# Patient Record
Sex: FEMALE | Race: BLACK OR AFRICAN AMERICAN | NOT HISPANIC OR LATINO | Employment: FULL TIME | URBAN - METROPOLITAN AREA
[De-identification: names, ages, dates, MRNs, and addresses within clinical notes are randomized per-mention and may not be internally consistent; named-entity substitution may affect disease eponyms.]

---
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
## 2018-02-12 ENCOUNTER — HOSPITAL ENCOUNTER (EMERGENCY)
Facility: HOSPITAL | Age: 18
Discharge: HOME | End: 2018-02-12
Attending: EMERGENCY MEDICINE | Admitting: EMERGENCY MEDICINE

## 2018-02-12 VITALS
TEMPERATURE: 98.7 F | HEIGHT: 68 IN | OXYGEN SATURATION: 97 % | HEART RATE: 90 BPM | BODY MASS INDEX: 44.41 KG/M2 | RESPIRATION RATE: 20 BRPM | SYSTOLIC BLOOD PRESSURE: 126 MMHG | DIASTOLIC BLOOD PRESSURE: 84 MMHG | WEIGHT: 293 LBS

## 2018-02-12 DIAGNOSIS — M54.2 NECK DISCOMFORT: Primary | ICD-10-CM

## 2018-02-12 LAB
ALBUMIN SERPL-MCNC: 4.9 G/DL (ref 3.2–4.8)
ALBUMIN/GLOB SERPL: 1.6 G/DL (ref 1.5–2.5)
ALP SERPL-CCNC: 127 U/L (ref 25–100)
ALT SERPL W P-5'-P-CCNC: 21 U/L (ref 7–40)
ANION GAP SERPL CALCULATED.3IONS-SCNC: 4 MMOL/L (ref 3–11)
AST SERPL-CCNC: 16 U/L (ref 0–33)
BASOPHILS # BLD AUTO: 0.04 10*3/MM3 (ref 0–0.2)
BASOPHILS NFR BLD AUTO: 0.4 % (ref 0–1)
BILIRUB SERPL-MCNC: 0.3 MG/DL (ref 0.3–1.2)
BUN BLD-MCNC: 11 MG/DL (ref 9–23)
BUN/CREAT SERPL: 11 (ref 7–25)
CALCIUM SPEC-SCNC: 9.4 MG/DL (ref 8.7–10.4)
CHLORIDE SERPL-SCNC: 104 MMOL/L (ref 99–109)
CO2 SERPL-SCNC: 26 MMOL/L (ref 20–31)
CREAT BLD-MCNC: 1 MG/DL (ref 0.6–1.3)
DEPRECATED RDW RBC AUTO: 45.3 FL (ref 37–54)
EOSINOPHIL # BLD AUTO: 0.09 10*3/MM3 (ref 0–0.3)
EOSINOPHIL NFR BLD AUTO: 0.9 % (ref 0–3)
ERYTHROCYTE [DISTWIDTH] IN BLOOD BY AUTOMATED COUNT: 15.5 % (ref 11.3–14.5)
GFR SERPL CREATININE-BSD FRML MDRD: 87 ML/MIN/1.73
GLOBULIN UR ELPH-MCNC: 3.1 GM/DL
GLUCOSE BLD-MCNC: 89 MG/DL (ref 70–100)
HCT VFR BLD AUTO: 37.3 % (ref 34.5–44)
HETEROPH AB SER QL LA: NEGATIVE
HGB BLD-MCNC: 11.7 G/DL (ref 11.5–15.5)
IMM GRANULOCYTES # BLD: 0.02 10*3/MM3 (ref 0–0.03)
IMM GRANULOCYTES NFR BLD: 0.2 % (ref 0–0.6)
LYMPHOCYTES # BLD AUTO: 3.03 10*3/MM3 (ref 0.6–4.8)
LYMPHOCYTES NFR BLD AUTO: 31.8 % (ref 24–44)
MCH RBC QN AUTO: 24.9 PG (ref 27–31)
MCHC RBC AUTO-ENTMCNC: 31.4 G/DL (ref 32–36)
MCV RBC AUTO: 79.5 FL (ref 80–99)
MONOCYTES # BLD AUTO: 0.48 10*3/MM3 (ref 0–1)
MONOCYTES NFR BLD AUTO: 5 % (ref 0–12)
NEUTROPHILS # BLD AUTO: 5.87 10*3/MM3 (ref 1.5–8.3)
NEUTROPHILS NFR BLD AUTO: 61.7 % (ref 41–71)
PLATELET # BLD AUTO: 289 10*3/MM3 (ref 150–450)
PMV BLD AUTO: 10.1 FL (ref 6–12)
POTASSIUM BLD-SCNC: 3.6 MMOL/L (ref 3.5–5.5)
PROT SERPL-MCNC: 8 G/DL (ref 5.7–8.2)
RBC # BLD AUTO: 4.69 10*6/MM3 (ref 3.89–5.14)
SODIUM BLD-SCNC: 134 MMOL/L (ref 132–146)
T4 FREE SERPL-MCNC: 0.93 NG/DL (ref 0.89–1.76)
TROPONIN I SERPL-MCNC: 0 NG/ML (ref 0–0.07)
TSH SERPL DL<=0.05 MIU/L-ACNC: 1.77 MIU/ML (ref 0.35–5.35)
WBC NRBC COR # BLD: 9.53 10*3/MM3 (ref 4.5–13.5)

## 2018-02-12 PROCEDURE — 85025 COMPLETE CBC W/AUTO DIFF WBC: CPT | Performed by: EMERGENCY MEDICINE

## 2018-02-12 PROCEDURE — 99283 EMERGENCY DEPT VISIT LOW MDM: CPT

## 2018-02-12 PROCEDURE — 84443 ASSAY THYROID STIM HORMONE: CPT | Performed by: EMERGENCY MEDICINE

## 2018-02-12 PROCEDURE — 86308 HETEROPHILE ANTIBODY SCREEN: CPT | Performed by: EMERGENCY MEDICINE

## 2018-02-12 PROCEDURE — 80053 COMPREHEN METABOLIC PANEL: CPT | Performed by: EMERGENCY MEDICINE

## 2018-02-12 PROCEDURE — 84439 ASSAY OF FREE THYROXINE: CPT | Performed by: EMERGENCY MEDICINE

## 2018-02-12 PROCEDURE — 84484 ASSAY OF TROPONIN QUANT: CPT

## 2018-02-12 PROCEDURE — 93005 ELECTROCARDIOGRAM TRACING: CPT

## 2018-02-13 NOTE — DISCHARGE INSTRUCTIONS
If you develop acute or urgent symptoms return to the emergency room for evaluation.    Follow up with one of the [facility] physician groups below to setup primary care. If you have trouble following up, please call Jessie Kendrick, our transitional care nurse, at [phone].    (Dr. Sharpe, Dr. Mathur, Dr. Hanna, and Dr. Armstrong.)  [facility], Primary Care, [phone], [address], [place] [address]    [facility], Primary Care, [phone], [address], [address] [place], [address] [facility], Primary Care, [phone], [address], [address] [place], [address] [facility], Primary Care, [phone], [address], [address] [place], [address]     [place] [address] [facility], Primary Care, [phone], [address], [address] [place], [address]    [place] [address] [facility], Primary Care, [phone], [address]    [place], [address] [facility], Primary Care, [phone], [address] [place], [address] [facility], Primary Care, [phone], [address], [address] [place], [address] [facility]) [facility], Primary Care, [phone], [address], [address] [place], [address] [facility], Primary Care, [phone], [address], [address] [place], [address] [facility], Primary Care, [phone], [address], [address], [place], [address] [place] [address], Primary Care, [phone], [address] [facility], [address], [facility]    Follow up with one of the physician centers below to setup  primary care.    [facility], [phone], [address], [address], [place], [facility]    [facility], [phone], [address] [facility], [address] [place] [address], [phone], [address] [facility] [address] [place] [address];     [facility], [phone], [address] [place] [address]

## 2018-02-13 NOTE — ED PROVIDER NOTES
"Subjective   HPI Comments: Hien Jean is a 17 y.o.female who presents to the ED with c/o throat discomfort. For the past 2 months the patient has had an intermittent feeling of squeezing in her left throat. It is not painful and she is not short of breath. Her symptoms are not noticeably exacerbated or relieved by anything. After continued symptoms today she presents to the ED for evaluation. At the ED her mother reports that she has had a decreased appetite and appears fatigued. The patient denies any other acute symptoms at this time.      Patient is a 17 y.o. female presenting with general illness.   History provided by:  Patient  Illness   Location:  Left throat  Quality:  \"squeezing\"  Onset quality:  Gradual  Duration:  2 months  Timing:  Intermittent  Progression:  Unchanged  Chronicity:  New  Relieved by:  None tried  Worsened by:  Nothing  Ineffective treatments:  None tried  Associated symptoms: no chest pain, no congestion, no cough, no diarrhea, no fever, no nausea, no shortness of breath, no sore throat and no vomiting        Review of Systems   Constitutional: Negative for chills, diaphoresis and fever.   HENT: Negative for congestion and sore throat.         Feeling of squeezing in throat   Respiratory: Negative for cough and shortness of breath.    Cardiovascular: Negative for chest pain.   Gastrointestinal: Negative for diarrhea, nausea and vomiting.   All other systems reviewed and are negative.      Past Medical History:   Diagnosis Date   • History of blood transfusion    • Migraines        No Known Allergies    Past Surgical History:   Procedure Laterality Date   • ADENOIDECTOMY     • DENTAL PROCEDURE     • TONSILLECTOMY         History reviewed. No pertinent family history.    Social History     Social History   • Marital status: Single     Spouse name: N/A   • Number of children: N/A   • Years of education: N/A     Social History Main Topics   • Smoking status: Never Smoker   • Smokeless " tobacco: None   • Alcohol use No   • Drug use: No   • Sexual activity: No     Other Topics Concern   • None     Social History Narrative         Objective   Physical Exam   Constitutional: She is oriented to person, place, and time. She appears well-developed and well-nourished. No distress.   HENT:   Head: Normocephalic and atraumatic.   Right Ear: External ear normal.   Left Ear: External ear normal.   Mouth/Throat: Oropharynx is clear and moist. No oropharyngeal exudate.   Uvula is midline. No asymmetry in the posterior oropharynx.   Eyes: Conjunctivae are normal. No scleral icterus.   Neck: Normal range of motion. Neck supple. No JVD present. No thyromegaly present.   Cardiovascular: Normal rate, regular rhythm and normal heart sounds.  Exam reveals no gallop and no friction rub.    No murmur heard.  Pulmonary/Chest: Effort normal and breath sounds normal. No respiratory distress. She has no wheezes. She has no rales.   Abdominal: Soft. Bowel sounds are normal. She exhibits no distension. There is no tenderness. There is no rebound and no guarding.   Musculoskeletal: Normal range of motion. She exhibits no edema.   Lymphadenopathy:     She has no cervical adenopathy.   Neurological: She is alert and oriented to person, place, and time.   Skin: Skin is warm and dry. She is not diaphoretic.   Psychiatric: She has a normal mood and affect. Her behavior is normal.   Nursing note and vitals reviewed.      Procedures         ED Course  ED Course     No results found for this or any previous visit (from the past 24 hour(s)).  Note: In addition to lab results from this visit, the labs listed above may include labs taken at another facility or during a different encounter within the last 24 hours. Please correlate lab times with ED admission and discharge times for further clarification of the services performed during this visit.    No orders to display     Vitals:    02/12/18 1857 02/12/18 1909 02/12/18 2100   BP:  "143/74  126/84   Pulse: 99  90   Resp: 20     Temp: 98.7 °F (37.1 °C)     TempSrc: Oral     SpO2: 96%  97%   Weight:  134 kg (295 lb)    Height: 172.7 cm (68\")       Medications - No data to display  ECG/EMG Results (last 24 hours)     Procedure Component Value Units Date/Time    ECG 12 Lead [[id number]] Collected:  02/12/18 1909     Updated:  02/12/18 1910                    MDM    Final diagnoses:   Neck discomfort       Documentation assistance provided by diego Bailon.  Information recorded by the scrsusan was done at my direction and has been verified and validated by me.     Jose Bailon  02/12/18 1927       Horacio Banda MD  02/19/18 2205    "

## 2019-05-06 ENCOUNTER — OFFICE VISIT (OUTPATIENT)
Dept: INTERNAL MEDICINE | Facility: CLINIC | Age: 19
End: 2019-05-06

## 2019-05-06 VITALS
DIASTOLIC BLOOD PRESSURE: 86 MMHG | RESPIRATION RATE: 16 BRPM | BODY MASS INDEX: 39.89 KG/M2 | WEIGHT: 263.2 LBS | SYSTOLIC BLOOD PRESSURE: 128 MMHG | OXYGEN SATURATION: 99 % | TEMPERATURE: 98.1 F | HEART RATE: 78 BPM | HEIGHT: 68 IN

## 2019-05-06 DIAGNOSIS — Z91.018 MULTIPLE FOOD ALLERGIES: ICD-10-CM

## 2019-05-06 DIAGNOSIS — E66.01 MORBIDLY OBESE (HCC): ICD-10-CM

## 2019-05-06 DIAGNOSIS — G43.009 MIGRAINE WITHOUT AURA AND WITHOUT STATUS MIGRAINOSUS, NOT INTRACTABLE: Primary | ICD-10-CM

## 2019-05-06 PROCEDURE — 99204 OFFICE O/P NEW MOD 45 MIN: CPT | Performed by: NURSE PRACTITIONER

## 2019-05-06 RX ORDER — SUMATRIPTAN 25 MG/1
TABLET, FILM COATED ORAL
Qty: 9 TABLET | Refills: 0 | Status: SHIPPED | OUTPATIENT
Start: 2019-05-06 | End: 2021-08-17

## 2019-05-06 NOTE — PATIENT INSTRUCTIONS
Migraine Headache  A migraine headache is a very strong throbbing pain on one side or both sides of your head. Migraines can also cause other symptoms. Talk with your doctor about what things may bring on (trigger) your migraine headaches.  Follow these instructions at home:  Medicines  · Take over-the-counter and prescription medicines only as told by your doctor.  · Do not drive or use heavy machinery while taking prescription pain medicine.  · To prevent or treat constipation while you are taking prescription pain medicine, your doctor may recommend that you:  ? Drink enough fluid to keep your pee (urine) clear or pale yellow.  ? Take over-the-counter or prescription medicines.  ? Eat foods that are high in fiber. These include fresh fruits and vegetables, whole grains, and beans.  ? Limit foods that are high in fat and processed sugars. These include fried and sweet foods.  Lifestyle  · Avoid alcohol.  · Do not use any products that contain nicotine or tobacco, such as cigarettes and e-cigarettes. If you need help quitting, ask your doctor.  · Get at least 8 hours of sleep every night.  · Limit your stress.  General instructions    · Keep a journal to find out what may bring on your migraines. For example, write down:  ? What you eat and drink.  ? How much sleep you get.  ? Any change in what you eat or drink.  ? Any change in your medicines.  · If you have a migraine:  ? Avoid things that make your symptoms worse, such as bright lights.  ? It may help to lie down in a dark, quiet room.  ? Do not drive or use heavy machinery.  ? Ask your doctor what activities are safe for you.  · Keep all follow-up visits as told by your doctor. This is important.  Contact a doctor if:  · You get a migraine that is different or worse than your usual migraines.  Get help right away if:  · Your migraine gets very bad.  · You have a fever.  · You have a stiff neck.  · You have trouble seeing.  · Your muscles feel weak or like you  cannot control them.  · You start to lose your balance a lot.  · You start to have trouble walking.  · You pass out (faint).  This information is not intended to replace advice given to you by your health care provider. Make sure you discuss any questions you have with your health care provider.  Document Released: 09/26/2009 Document Revised: 07/07/2017 Document Reviewed: 06/05/2017  [employer] Interactive Patient Education © 2019 [employer]    Exercising to Lose Weight  Exercising can help you to lose weight. In order to lose weight through exercise, you need to do vigorous-intensity exercise. You can tell that you are exercising with vigorous intensity if you are breathing very hard and fast and cannot hold a conversation while exercising.  Moderate-intensity exercise helps to maintain your current weight. You can tell that you are exercising at a moderate level if you have a higher heart rate and faster breathing, but you are still able to hold a conversation.  How often should I exercise?  Choose an activity that you enjoy and set realistic goals. Your health care provider can help you to make an activity plan that works for you. Exercise regularly as directed by your health care provider. This may include:  · Doing resistance training twice each week, such as:  ? Push-ups.  ? Sit-ups.  ? Lifting weights.  ? Using resistance bands.  · Doing a given intensity of exercise for a given amount of time. Choose from these options:  ? 150 minutes of moderate-intensity exercise every week.  ? 75 minutes of vigorous-intensity exercise every week.  ? A mix of moderate-intensity and vigorous-intensity exercise every week.    Children, pregnant women, people who are out of shape, people who are overweight, and older adults may need to consult a health care provider for individual recommendations. If you have any sort of medical condition, be sure to consult your health care provider before starting a new exercise  program.  What are some activities that can help me to lose weight?  · Walking at a rate of at least 4.5 miles an hour.  · Jogging or running at a rate of 5 miles per hour.  · Biking at a rate of at least 10 miles per hour.  · Lap swimming.  · Roller-skating or in-line skating.  · Cross-country skiing.  · Vigorous competitive sports, such as football, basketball, and soccer.  · Jumping rope.  · Aerobic dancing.  How can I be more active in my day-to-day activities?  · Use the stairs instead of the elevator.  · Take a walk during your lunch break.  · If you drive, park your car farther away from work or school.  · If you take public transportation, get off one stop early and walk the rest of the way.  · Make all of your phone calls while standing up and walking around.  · Get up, stretch, and walk around every 30 minutes throughout the day.  What guidelines should I follow while exercising?  · Do not exercise so much that you hurt yourself, feel dizzy, or get very short of breath.  · Consult your health care provider prior to starting a new exercise program.  · Wear comfortable clothes and shoes with good support.  · Drink plenty of water while you exercise to prevent dehydration or heat stroke. Body water is lost during exercise and must be replaced.  · Work out until you breathe faster and your heart beats faster.  This information is not intended to replace advice given to you by your health care provider. Make sure you discuss any questions you have with your health care provider.  Document Released: 01/20/2012 Document Revised: 05/25/2017 Document Reviewed: 05/21/2015  [employer] Interactive Patient Education © 2019 [employer]    Calorie Counting for Weight Loss  Calories are units of energy. Your body needs a certain amount of calories from food to keep you going throughout the day. When you eat more calories than your body needs, your body stores the extra calories as fat. When you eat fewer calories than  your body needs, your body burns fat to get the energy it needs.  Calorie counting means keeping track of how many calories you eat and drink each day. Calorie counting can be helpful if you need to lose weight. If you make sure to eat fewer calories than your body needs, you should lose weight. Ask your health care provider what a healthy weight is for you.  For calorie counting to work, you will need to eat the right number of calories in a day in order to lose a healthy amount of weight per week. A dietitian can help you determine how many calories you need in a day and will give you suggestions on how to reach your calorie goal.  · A healthy amount of weight to lose per week is usually 1-2 lb (0.5-0.9 kg). This usually means that your daily calorie intake should be reduced by 500-750 calories.  · Eating 1,200 - 1,500 calories per day can help most women lose weight.  · Eating 1,500 - 1,800 calories per day can help most men lose weight.    What is my plan?  My goal is to have __________ calories per day.  If I have this many calories per day, I should lose around __________ pounds per week.  What do I need to know about calorie counting?  In order to meet your daily calorie goal, you will need to:  · Find out how many calories are in each food you would like to eat. Try to do this before you eat.  · Decide how much of the food you plan to eat.  · Write down what you ate and how many calories it had. Doing this is called keeping a food log.    To successfully lose weight, it is important to balance calorie counting with a healthy lifestyle that includes regular activity. Aim for 150 minutes of moderate exercise (such as walking) or 75 minutes of vigorous exercise (such as running) each week.  Where do I find calorie information?    The number of calories in a food can be found on a Nutrition Facts label. If a food does not have a Nutrition Facts label, try to look up the calories online or ask your dietitian for  help.  Remember that calories are listed per serving. If you choose to have more than one serving of a food, you will have to multiply the calories per serving by the amount of servings you plan to eat. For example, the label on a package of bread might say that a serving size is 1 slice and that there are 90 calories in a serving. If you eat 1 slice, you will have eaten 90 calories. If you eat 2 slices, you will have eaten 180 calories.  How do I keep a food log?  Immediately after each meal, record the following information in your food log:  · What you ate. Don't forget to include toppings, sauces, and other extras on the food.  · How much you ate. This can be measured in cups, ounces, or number of items.  · How many calories each food and drink had.  · The total number of calories in the meal.    Keep your food log near you, such as in a small notebook in your pocket, or use a mobile pardeep or website. Some programs will calculate calories for you and show you how many calories you have left for the day to meet your goal.  What are some calorie counting tips?  · Use your calories on foods and drinks that will fill you up and not leave you hungry:  ? Some examples of foods that fill you up are nuts and nut butters, vegetables, lean proteins, and high-fiber foods like whole grains. High-fiber foods are foods with more than 5 g fiber per serving.  ? Drinks such as sodas, specialty coffee drinks, alcohol, and juices have a lot of calories, yet do not fill you up.  · Eat nutritious foods and avoid empty calories. Empty calories are calories you get from foods or beverages that do not have many vitamins or protein, such as candy, sweets, and soda. It is better to have a nutritious high-calorie food (such as an avocado) than a food with few nutrients (such as a bag of chips).  · Know how many calories are in the foods you eat most often. This will help you calculate calorie counts faster.  · Pay attention to calories in  "drinks. Low-calorie drinks include water and unsweetened drinks.  · Pay attention to nutrition labels for \"low fat\" or \"fat free\" foods. These foods sometimes have the same amount of calories or more calories than the full fat versions. They also often have added sugar, starch, or salt, to make up for flavor that was removed with the fat.  · Find a way of tracking calories that works for you. Get creative. Try different apps or programs if writing down calories does not work for you.  What are some portion control tips?  · Know how many calories are in a serving. This will help you know how many servings of a certain food you can have.  · Use a measuring cup to measure serving sizes. You could also try weighing out portions on a kitchen scale. With time, you will be able to estimate serving sizes for some foods.  · Take some time to put servings of different foods on your favorite plates, bowls, and cups so you know what a serving looks like.  · Try not to eat straight from a bag or box. Doing this can lead to overeating. Put the amount you would like to eat in a cup or on a plate to make sure you are eating the right portion.  · Use smaller plates, glasses, and bowls to prevent overeating.  · Try not to multitask (for example, watch TV or use your computer) while eating. If it is time to eat, sit down at a table and enjoy your food. This will help you to know when you are full. It will also help you to be aware of what you are eating and how much you are eating.  What are tips for following this plan?  Reading food labels  · Check the calorie count compared to the serving size. The serving size may be smaller than what you are used to eating.  · Check the source of the calories. Make sure the food you are eating is high in vitamins and protein and low in saturated and trans fats.  Shopping  · Read nutrition labels while you shop. This will help you make healthy decisions before you decide to purchase your " food.  · Make a grocery list and stick to it.  Cooking  · Try to cook your favorite foods in a healthier way. For example, try baking instead of frying.  · Use low-fat dairy products.  Meal planning  · Use more fruits and vegetables. Half of your plate should be fruits and vegetables.  · Include lean proteins like poultry and fish.  How do I count calories when eating out?  · Ask for smaller portion sizes.  · Consider sharing an entree and sides instead of getting your own entree.  · If you get your own entree, eat only half. Ask for a box at the beginning of your meal and put the rest of your entree in it so you are not tempted to eat it.  · If calories are listed on the menu, choose the lower calorie options.  · Choose dishes that include vegetables, fruits, whole grains, low-fat dairy products, and lean protein.  · Choose items that are boiled, broiled, grilled, or steamed. Stay away from items that are buttered, battered, fried, or served with cream sauce. Items labeled “crispy” are usually fried, unless stated otherwise.  · Choose water, low-fat milk, unsweetened iced tea, or other drinks without added sugar. If you want an alcoholic beverage, choose a lower calorie option such as a glass of wine or light beer.  · Ask for dressings, sauces, and syrups on the side. These are usually high in calories, so you should limit the amount you eat.  · If you want a salad, choose a garden salad and ask for grilled meats. Avoid extra toppings like cody, cheese, or fried items. Ask for the dressing on the side, or ask for olive oil and vinegar or lemon to use as dressing.  · Estimate how many servings of a food you are given. For example, a serving of cooked rice is ½ cup or about the size of half a baseball. Knowing serving sizes will help you be aware of how much food you are eating at restaurants. The list below tells you how big or small some common portion sizes are based on everyday objects:  ? 1 oz--4 stacked  dice.  ? 3 oz--1 deck of cards.  ? 1 tsp--1 die.  ? 1 Tbsp--½ a ping-pong ball.  ? 2 Tbsp--1 ping-pong ball.  ? ½ cup--½ baseball.  ? 1 cup--1 baseball.  Summary  · Calorie counting means keeping track of how many calories you eat and drink each day. If you eat fewer calories than your body needs, you should lose weight.  · A healthy amount of weight to lose per week is usually 1-2 lb (0.5-0.9 kg). This usually means reducing your daily calorie intake by 500-750 calories.  · The number of calories in a food can be found on a Nutrition Facts label. If a food does not have a Nutrition Facts label, try to look up the calories online or ask your dietitian for help.  · Use your calories on foods and drinks that will fill you up, and not on foods and drinks that will leave you hungry.  · Use smaller plates, glasses, and bowls to prevent overeating.  This information is not intended to replace advice given to you by your health care provider. Make sure you discuss any questions you have with your health care provider.  Document Released: 12/18/2006 Document Revised: 11/17/2017 Document Reviewed: 11/17/2017  [employer] Interactive Patient Education © 2019 [employer]

## 2019-05-06 NOTE — PROGRESS NOTES
Subjective   Abisai Jean is a 18 y.o. female here to establish care.  Chief Complaint   Patient presents with   • Establish Care   • migraines     worse over last few months       Migraine    This is a chronic problem. The current episode started more than 1 year ago. The problem has been gradually worsening. The pain is located in the right unilateral region. The quality of the pain is described as aching and pulsating (tension in her head). Associated symptoms include blurred vision (mild during migraine), nausea (during migraine), phonophobia and photophobia. Pertinent negatives include no abdominal pain, abnormal behavior, anorexia, back pain, coughing, dizziness, drainage, ear pain, eye pain, eye redness, eye watering, facial sweating, fever, hearing loss, insomnia, loss of balance, muscle aches, neck pain, numbness, rhinorrhea, scalp tenderness, seizures, sinus pressure, sore throat, swollen glands, tingling, tinnitus, visual change, vomiting, weakness or weight loss. The symptoms are aggravated by bright light, noise and work. She has tried acetaminophen (aleve, excedrin migraine,. advil, ) for the symptoms. The treatment provided mild relief. Her past medical history is significant for migraine headaches, migraines in the family and obesity. There is no history of cancer, cluster headaches, hypertension, immunosuppression, pseudotumor cerebri, recent head traumas, sinus disease or TMJ.   Was occurring 3 times a month.  Got ear piercing and now pain is not happening at all in the last month.   Has appt in June 2019 with  neurology for evaluation of migraines.     Food allergies:  Mushrooms, gluten intolerances, has to go to [place] in the last few years with allergy after a meal at [employer]. Had a past a meal.   Has rash and itching all over.     The following portions of the patient's history were reviewed and updated as appropriate: allergies, current medications, past  "family history, past medical history, past social history, past surgical history and problem list.    Review of Systems   Constitutional: Negative for appetite change, fatigue, fever and weight loss.   HENT: Negative for ear pain, hearing loss, rhinorrhea, sinus pressure, sore throat and tinnitus.    Eyes: Positive for blurred vision (mild during migraine) and photophobia. Negative for pain and redness.   Respiratory: Negative for cough and shortness of breath.    Cardiovascular: Negative for chest pain and palpitations.   Gastrointestinal: Positive for nausea (during migraine). Negative for abdominal pain, anorexia and vomiting.   Musculoskeletal: Negative for arthralgias, back pain and neck pain.   Allergic/Immunologic:        C/o food allergies   Neurological: Negative for dizziness, tingling, seizures, weakness, numbness, headaches and loss of balance.   Psychiatric/Behavioral: The patient does not have insomnia.      Blood pressure 128/86, pulse 78, temperature 98.1 °F (36.7 °C), resp. rate 16, height 173.2 cm (68.2\"), weight 119 kg (263 lb 3.2 oz), last menstrual period 05/02/2019, SpO2 99 %, not currently breastfeeding.    No Known Allergies  Past Medical History:   Diagnosis Date   • Asthma     as a child   • History of blood transfusion     after heavy menses   • Menorrhagia     in high school   • Migraines      Past Surgical History:   Procedure Laterality Date   • ADENOIDECTOMY     • DENTAL PROCEDURE      [place] teeth   • EAR TUBES     • TONSILLECTOMY       Family History   Problem Relation Age of Onset   • Hypertension Mother    • Obesity Mother    • Migraines Mother    • Lung cancer Maternal Grandmother    • Hypertension Maternal Grandmother    • Cancer Maternal Great-Grandmother    • Diabetes Maternal Great-Grandmother    • Hypertension Maternal Great-Grandmother    • Migraines Maternal Great-Grandmother    • Heart attack Maternal Grandfather      Social History     Socioeconomic History   • Marital " status: Single     Spouse name: Not on file   • Number of children: 0   • Years of education: Not on file   • Highest education level: Some college, no degree   Occupational History   • Occupation: environmental services at     Social Needs   • Financial resource strain: Not hard at all   • Food insecurity:     Worry: Never true     Inability: Never true   • Transportation needs:     Medical: No     Non-medical: No   Tobacco Use   • Smoking status: Never Smoker   • Smokeless tobacco: Never Used   Substance and Sexual Activity   • Alcohol use: No   • Drug use: No   • Sexual activity: No     Birth control/protection: None   Lifestyle   • Physical activity:     Days per week: 2 days     Minutes per session: 30 min   • Stress: Only a little   Social History Narrative    Lives with her mom and her sister     Immunization History   Administered Date(s) Administered   • Influenza, Unspecified 01/01/2019       Current Outpatient Medications:   •  ondansetron (ZOFRAN) 4 MG tablet, Take 1 tablet by mouth Every 6 (Six) Hours As Needed for Nausea or Vomiting., Disp: 15 tablet, Rfl: 0  •  SUMAtriptan (IMITREX) 25 MG tablet, Take one tablet at onset of headache. May repeat dose one time in 2 hours if headache not relieved., Disp: 9 tablet, Rfl: 0    Objective   Physical Exam   Constitutional: She is oriented to person, place, and time. She appears well-developed and well-nourished. No distress.   HENT:   Head: Normocephalic and atraumatic.   Nose: Right sinus exhibits no maxillary sinus tenderness and no frontal sinus tenderness. Left sinus exhibits no maxillary sinus tenderness and no frontal sinus tenderness.   Mouth/Throat: Oropharynx is clear and moist.   Eyes: Conjunctivae and EOM are normal. Pupils are equal, round, and reactive to light.   Neck: Normal range of motion. Neck supple.   Cardiovascular: Normal rate, regular rhythm and normal heart sounds.   Pulmonary/Chest: Effort normal and breath sounds normal. No  respiratory distress.   Abdominal: Soft. Normal appearance and bowel sounds are normal. There is no tenderness.   Musculoskeletal: Normal range of motion.   Neurological: She is alert and oriented to person, place, and time. She has normal strength. She displays normal reflexes. No cranial nerve deficit or sensory deficit. Coordination normal.   Skin: Skin is warm and dry.   Psychiatric: She has a normal mood and affect. Her behavior is normal. Judgment and thought content normal.   Nursing note and vitals reviewed.      Assessment/Plan   Abisai was seen today for establish care and migraines.    Diagnoses and all orders for this visit:    Migraine without aura and without status migrainosus, not intractable  -     SUMAtriptan (IMITREX) 25 MG tablet; Take one tablet at onset of headache. May repeat dose one time in 2 hours if headache not relieved.    Morbidly obese (CMS/HCC)    Multiple food allergies  -     Ambulatory Referral to Allergy         Keep appt with  neuro for migraine evaluation and treatment  Imitrex PRN until seen by them.   Refer to allergist at her request.   Encouraged diet and exercise for weight reduction  Return in about 3 weeks (around 5/27/2019) for Annual, with fasting labs.  Plan of care discussed with pt. They verbalized understanding and agreement.       * Please note that portions of this note were completed with a voice recognition program. Efforts were made to edit the dictation but occasionally words are erroneously transcribed.

## 2019-05-29 ENCOUNTER — OFFICE VISIT (OUTPATIENT)
Dept: INTERNAL MEDICINE | Facility: CLINIC | Age: 19
End: 2019-05-29

## 2019-05-29 VITALS
WEIGHT: 264.6 LBS | HEART RATE: 66 BPM | DIASTOLIC BLOOD PRESSURE: 84 MMHG | OXYGEN SATURATION: 97 % | TEMPERATURE: 97.5 F | RESPIRATION RATE: 16 BRPM | HEIGHT: 68 IN | SYSTOLIC BLOOD PRESSURE: 126 MMHG | BODY MASS INDEX: 40.1 KG/M2

## 2019-05-29 DIAGNOSIS — E53.8 B12 DEFICIENCY: ICD-10-CM

## 2019-05-29 DIAGNOSIS — Z13.220 LIPID SCREENING: ICD-10-CM

## 2019-05-29 DIAGNOSIS — Z83.3 FAMILY HISTORY OF DIABETES MELLITUS: ICD-10-CM

## 2019-05-29 DIAGNOSIS — Z13.1 DIABETES MELLITUS SCREENING: ICD-10-CM

## 2019-05-29 DIAGNOSIS — E55.9 VITAMIN D DEFICIENCY: ICD-10-CM

## 2019-05-29 DIAGNOSIS — G43.009 MIGRAINE WITHOUT AURA AND WITHOUT STATUS MIGRAINOSUS, NOT INTRACTABLE: ICD-10-CM

## 2019-05-29 DIAGNOSIS — E66.01 MORBIDLY OBESE (HCC): ICD-10-CM

## 2019-05-29 DIAGNOSIS — Z00.00 ANNUAL PHYSICAL EXAM: Primary | ICD-10-CM

## 2019-05-29 DIAGNOSIS — Z13.29 THYROID DISORDER SCREEN: ICD-10-CM

## 2019-05-29 LAB
25(OH)D3 SERPL-MCNC: 6.9 NG/ML (ref 30–100)
ALBUMIN SERPL-MCNC: 4.5 G/DL (ref 3.5–5.2)
ALBUMIN/GLOB SERPL: 1.4 G/DL
ALP SERPL-CCNC: 111 U/L (ref 39–117)
ALT SERPL W P-5'-P-CCNC: 11 U/L (ref 1–33)
ANION GAP SERPL CALCULATED.3IONS-SCNC: 14.1 MMOL/L
AST SERPL-CCNC: 18 U/L (ref 1–32)
BILIRUB BLD-MCNC: NEGATIVE MG/DL
BILIRUB SERPL-MCNC: 0.3 MG/DL (ref 0.2–1.2)
BUN BLD-MCNC: 8 MG/DL (ref 6–20)
BUN/CREAT SERPL: 8.8 (ref 7–25)
CALCIUM SPEC-SCNC: 9 MG/DL (ref 8.6–10.5)
CHLORIDE SERPL-SCNC: 107 MMOL/L (ref 98–107)
CHOLEST SERPL-MCNC: 176 MG/DL (ref 0–200)
CLARITY, POC: CLEAR
CO2 SERPL-SCNC: 22.9 MMOL/L (ref 22–29)
COLOR UR: YELLOW
CREAT BLD-MCNC: 0.91 MG/DL (ref 0.57–1)
DEPRECATED RDW RBC AUTO: 44.9 FL (ref 37–54)
ERYTHROCYTE [DISTWIDTH] IN BLOOD BY AUTOMATED COUNT: 14.4 % (ref 12.3–15.4)
GFR SERPL CREATININE-BSD FRML MDRD: 96 ML/MIN/1.73
GLOBULIN UR ELPH-MCNC: 3.2 GM/DL
GLUCOSE BLD-MCNC: 75 MG/DL (ref 65–99)
GLUCOSE UR STRIP-MCNC: NEGATIVE MG/DL
HBA1C MFR BLD: 5.3 % (ref 4.8–5.6)
HCT VFR BLD AUTO: 38.9 % (ref 34–46.6)
HDLC SERPL-MCNC: 52 MG/DL (ref 40–60)
HGB BLD-MCNC: 11.5 G/DL (ref 12–15.9)
KETONES UR QL: NEGATIVE
LDLC SERPL CALC-MCNC: 109 MG/DL (ref 0–100)
LDLC/HDLC SERPL: 2.1 {RATIO}
LEUKOCYTE EST, POC: NEGATIVE
MCH RBC QN AUTO: 25.3 PG (ref 26.6–33)
MCHC RBC AUTO-ENTMCNC: 29.6 G/DL (ref 31.5–35.7)
MCV RBC AUTO: 85.5 FL (ref 79–97)
NITRITE UR-MCNC: NEGATIVE MG/ML
PH UR: 6 [PH] (ref 5–8)
PLATELET # BLD AUTO: 307 10*3/MM3 (ref 140–450)
PMV BLD AUTO: 10.5 FL (ref 6–12)
POTASSIUM BLD-SCNC: 4.1 MMOL/L (ref 3.5–5.2)
PROT SERPL-MCNC: 7.7 G/DL (ref 6–8.5)
PROT UR STRIP-MCNC: NEGATIVE MG/DL
RBC # BLD AUTO: 4.55 10*6/MM3 (ref 3.77–5.28)
RBC # UR STRIP: NEGATIVE /UL
SODIUM BLD-SCNC: 144 MMOL/L (ref 136–145)
SP GR UR: 1.02 (ref 1–1.03)
TRIGL SERPL-MCNC: 75 MG/DL (ref 0–150)
TSH SERPL DL<=0.05 MIU/L-ACNC: 2.5 MIU/ML (ref 0.27–4.2)
UROBILINOGEN UR QL: NORMAL
VIT B12 BLD-MCNC: 374 PG/ML (ref 211–946)
VLDLC SERPL-MCNC: 15 MG/DL (ref 5–40)
WBC NRBC COR # BLD: 7.15 10*3/MM3 (ref 3.4–10.8)

## 2019-05-29 PROCEDURE — 80053 COMPREHEN METABOLIC PANEL: CPT | Performed by: NURSE PRACTITIONER

## 2019-05-29 PROCEDURE — 80061 LIPID PANEL: CPT | Performed by: NURSE PRACTITIONER

## 2019-05-29 PROCEDURE — 83036 HEMOGLOBIN GLYCOSYLATED A1C: CPT | Performed by: NURSE PRACTITIONER

## 2019-05-29 PROCEDURE — 84443 ASSAY THYROID STIM HORMONE: CPT | Performed by: NURSE PRACTITIONER

## 2019-05-29 PROCEDURE — 85027 COMPLETE CBC AUTOMATED: CPT | Performed by: NURSE PRACTITIONER

## 2019-05-29 PROCEDURE — 82306 VITAMIN D 25 HYDROXY: CPT | Performed by: NURSE PRACTITIONER

## 2019-05-29 PROCEDURE — 81003 URINALYSIS AUTO W/O SCOPE: CPT | Performed by: NURSE PRACTITIONER

## 2019-05-29 PROCEDURE — 99395 PREV VISIT EST AGE 18-39: CPT | Performed by: NURSE PRACTITIONER

## 2019-05-29 PROCEDURE — 82607 VITAMIN B-12: CPT | Performed by: NURSE PRACTITIONER

## 2019-05-29 NOTE — PROGRESS NOTES
Patient Care Team:  Cristal Zafar APRN as PCP - General (Nurse Practitioner)  Kaleb Marroquin MD (Psychiatry)  Fozia Velazco MD (Allergy)     Chief complaint: Patient is in today for a physical          Patient is a 18 y.o. female who presents for her yearly physical exam.     HPI      Followed by [facility] for migraines. Has appt in July.   Will be seeing allergist tomorrow for reported food allergies    PHQ-2 Depression Screening  Little interest or pleasure in doing things? 0   Feeling down, depressed, or hopeless? 0   PHQ-2 Total Score 0     Not sexually active and never has been     Does not wear sunscreen.   Does not do SBE.     Health maintenance:  Hep A: 2016  Pap: will start at age 19  Tobacco use: denies  Eye exam: 2019  Dental exam: DUE    Diet: eats out rarely, drinks caffeine rarely, eats fruits and veggies, drinks lots of water    Exercise: walks every day    Health Maintenance Summary       Status Date      HPV VACCINES Overdue 5/30/2013     MENINGOCOCCAL VACCINE (Normal Risk) Overdue 5/30/2014     CHLAMYDIA SCREENING Postponed 6/3/2020 Originally 10/6/2017. Not Indicated    INFLUENZA VACCINE Next Due 8/1/2019      Done 1/1/2019 Imm Admin: Influenza, Unspecified    ANNUAL PHYSICAL Next Due 5/30/2020      Done 5/29/2019     TDAP/TD VACCINES Next Due 2/11/2021      Done 2/11/2016 Imm Admin: Tdap            Review of Systems   Constitutional: Negative for appetite change, chills, fatigue and fever.   HENT: Negative for congestion, ear pain, rhinorrhea, sinus pressure and sore throat.    Eyes: Negative for itching and visual disturbance.   Respiratory: Negative for cough, shortness of breath and wheezing.    Cardiovascular: Negative for chest pain, palpitations and leg swelling.   Gastrointestinal: Negative for abdominal pain, constipation, diarrhea, nausea and vomiting.   Endocrine: Negative for cold intolerance and heat intolerance.   Genitourinary: Negative for difficulty urinating,  dysuria and hematuria.   Musculoskeletal: Negative for arthralgias, back pain, joint swelling and myalgias.   Skin: Negative for rash and wound.   Allergic/Immunologic: Negative for environmental allergies and food allergies.   Neurological: Positive for headaches. Negative for dizziness and numbness.   Hematological: Negative for adenopathy. Does not bruise/bleed easily.   Psychiatric/Behavioral: Negative for dysphoric mood and sleep disturbance. The patient is not nervous/anxious.          History  Past Medical History:   Diagnosis Date   • Asthma     as a child   • History of blood transfusion     after heavy menses   • Menorrhagia     in high school   • Migraines       Past Surgical History:   Procedure Laterality Date   • ADENOIDECTOMY     • DENTAL PROCEDURE      [place] teeth   • EAR TUBES     • TONSILLECTOMY        No Known Allergies   Family History   Problem Relation Age of Onset   • Hypertension Mother    • Obesity Mother    • Migraines Mother    • Lung cancer Maternal Grandmother    • Hypertension Maternal Grandmother    • Cancer Maternal Great-Grandmother    • Diabetes Maternal Great-Grandmother    • Hypertension Maternal Great-Grandmother    • Migraines Maternal Great-Grandmother    • Heart attack Maternal Grandfather      Social History     Socioeconomic History   • Marital status: Single     Spouse name: Not on file   • Number of children: 0   • Years of education: Not on file   • Highest education level: Some college, no degree   Occupational History   • Occupation: environmental services at     Social Needs   • Financial resource strain: Not hard at all   • Food insecurity:     Worry: Never true     Inability: Never true   • Transportation needs:     Medical: No     Non-medical: No   Tobacco Use   • Smoking status: Never Smoker   • Smokeless tobacco: Never Used   Substance and Sexual Activity   • Alcohol use: No   • Drug use: No   • Sexual activity: No     Birth control/protection: None   Lifestyle  "  • Physical activity:     Days per week: 2 days     Minutes per session: 30 min   • Stress: Only a little   Social History Narrative    Lives with her mom and her sister        Current Outpatient Medications:   •  ondansetron (ZOFRAN) 4 MG tablet, Take 1 tablet by mouth Every 6 (Six) Hours As Needed for Nausea or Vomiting., Disp: 15 tablet, Rfl: 0  •  SUMAtriptan (IMITREX) 25 MG tablet, Take one tablet at onset of headache. May repeat dose one time in 2 hours if headache not relieved., Disp: 9 tablet, Rfl: 0   Immunization History   Administered Date(s) Administered   • DTaP 1998   • Hep A, Unspecified 02/11/2016   • Influenza, Unspecified 01/01/2019   • Tdap 02/11/2016                   /84   Pulse 66   Temp 97.5 °F (36.4 °C)   Resp 16   Ht 173.7 cm (68.4\")   Wt 120 kg (264 lb 9.6 oz)   LMP 05/02/2019 (Approximate)   SpO2 97%   Breastfeeding? No   BMI 39.76 kg/m²       Physical Exam   Constitutional: She is oriented to person, place, and time. She appears well-developed and well-nourished. No distress.   HENT:   Head: Normocephalic and atraumatic.   Right Ear: External ear normal.   Left Ear: External ear normal.   Nose: Nose normal.   Mouth/Throat: Oropharynx is clear and moist.   Eyes: Conjunctivae and EOM are normal. Pupils are equal, round, and reactive to light. Right eye exhibits no discharge. Left eye exhibits no discharge. No scleral icterus.   Neck: Normal range of motion. Neck supple. No JVD present. Carotid bruit is not present. No tracheal deviation present. No thyromegaly present.   Cardiovascular: Normal rate, regular rhythm, normal heart sounds and intact distal pulses. Exam reveals no gallop and no friction rub.   No murmur heard.  Pulmonary/Chest: Effort normal and breath sounds normal. No respiratory distress. She has no wheezes. She has no rales. She exhibits no tenderness. Right breast exhibits no inverted nipple, no mass, no nipple discharge, no skin change and no " tenderness. Left breast exhibits no inverted nipple, no mass, no nipple discharge, no skin change and no tenderness. Breasts are symmetrical. There is no breast swelling.   Abdominal: Soft. Normal appearance and bowel sounds are normal. She exhibits no distension and no mass. There is no hepatosplenomegaly. There is no tenderness. No hernia.   Genitourinary: No breast tenderness, discharge or bleeding.   Musculoskeletal: Normal range of motion. She exhibits no edema, tenderness or deformity.   Lymphadenopathy:        Head (right side): No submental, no submandibular, no tonsillar, no preauricular, no posterior auricular and no occipital adenopathy present.        Head (left side): No submental, no submandibular, no tonsillar, no preauricular, no posterior auricular and no occipital adenopathy present.     She has no cervical adenopathy.     She has no axillary adenopathy.   Neurological: She is alert and oriented to person, place, and time. She has normal reflexes. She displays normal reflexes.   Skin: Skin is warm and dry. No rash noted. She is not diaphoretic. No erythema. No pallor.   Psychiatric: She has a normal mood and affect. Her behavior is normal. Thought content normal.   Nursing note and vitals reviewed.                Diagnoses and all orders for this visit:    Annual physical exam  -     POCT urinalysis dipstick, automated  -     CBC (No Diff)  -     Comprehensive Metabolic Panel  -     Lipid Panel  -     TSH  -     Vitamin B12  -     Vitamin D 25 Hydroxy  -     Hemoglobin A1c    Morbidly obese (CMS/HCC)  -     Comprehensive Metabolic Panel  -     Lipid Panel  -     TSH  -     Hemoglobin A1c    Migraine without aura and without status migrainosus, not intractable    Diabetes mellitus screening  -     Hemoglobin A1c    Family history of diabetes mellitus  -     Hemoglobin A1c    Vitamin D deficiency  -     Vitamin D 25 Hydroxy    B12 deficiency  -     Vitamin B12    Thyroid disorder screen  -      TSH    Lipid screening  -     Lipid Panel         Labs sent- will notify of results and treat accordingly.   Immunizations and screenings  She will schedule dental exam  Counseling: SBE, sunscreen, diet and exercise, safe sex practices  Keep neuro and allergist follow ups as planned  Follow up: Return in about 1 year (around 5/29/2020) for Annual, with fasting labs, with pap.  Plan of care discussed with pt. They verbalized understanding and agreement.     Cristal Zafar, APRN   5/29/2019   10:52 AM              * Please note that portions of this note were completed with a voice recognition program. Efforts were made to edit the dictation but occasionally words are erroneously transcribed.

## 2019-05-29 NOTE — PATIENT INSTRUCTIONS
BMI for Adults  Body mass index (BMI) is a number that is calculated from a person's weight and height. In most adults, the number is used to find how much of an adult's weight is made up of fat. BMI is not as accurate as a direct measure of body fat.  How is BMI calculated?  BMI is calculated by dividing weight in kilograms by height in meters squared. It can also be calculated by dividing weight in pounds by height in inches squared, then multiplying the resulting number by 703. Charts are available to help you find your BMI quickly and easily without doing this calculation.  How is BMI interpreted?  Health care professionals use BMI charts to identify whether an adult is underweight, at a normal weight, or overweight based on the following guidelines:  · Underweight: BMI less than 18.5.  · Normal weight: BMI between 18.5 and 24.9.  · Overweight: BMI between 25 and 29.9.  · Obese: BMI of 30 and above.    BMI is usually interpreted the same for males and females.  Weight includes both fat and muscle, so someone with a muscular build, such as an athlete, may have a BMI that is higher than 24.9. In cases like these, BMI may not accurately depict body fat. To determine if excess body fat is the cause of a BMI of 25 or higher, further assessments may need to be done by a health care provider.  Why is BMI a useful tool?  BMI is used to identify a possible weight problem that may be related to a medical problem or may increase the risk for medical problems. BMI can also be used to promote changes to reach a healthy weight.  This information is not intended to replace advice given to you by your health care provider. Make sure you discuss any questions you have with your health care provider.  Document Released: 08/29/2005 Document Revised: 04/27/2017 Document Reviewed: 05/15/2015  [employer] Interactive Patient Education © 2018 [employer]    Exercising to Lose Weight  Exercising can help you to lose weight. In order to  lose weight through exercise, you need to do vigorous-intensity exercise. You can tell that you are exercising with vigorous intensity if you are breathing very hard and fast and cannot hold a conversation while exercising.  Moderate-intensity exercise helps to maintain your current weight. You can tell that you are exercising at a moderate level if you have a higher heart rate and faster breathing, but you are still able to hold a conversation.  How often should I exercise?  Choose an activity that you enjoy and set realistic goals. Your health care provider can help you to make an activity plan that works for you. Exercise regularly as directed by your health care provider. This may include:  · Doing resistance training twice each week, such as:  ? Push-ups.  ? Sit-ups.  ? Lifting weights.  ? Using resistance bands.  · Doing a given intensity of exercise for a given amount of time. Choose from these options:  ? 150 minutes of moderate-intensity exercise every week.  ? 75 minutes of vigorous-intensity exercise every week.  ? A mix of moderate-intensity and vigorous-intensity exercise every week.    Children, pregnant women, people who are out of shape, people who are overweight, and older adults may need to consult a health care provider for individual recommendations. If you have any sort of medical condition, be sure to consult your health care provider before starting a new exercise program.  What are some activities that can help me to lose weight?  · Walking at a rate of at least 4.5 miles an hour.  · Jogging or running at a rate of 5 miles per hour.  · Biking at a rate of at least 10 miles per hour.  · Lap swimming.  · Roller-skating or in-line skating.  · Cross-country skiing.  · Vigorous competitive sports, such as football, basketball, and soccer.  · Jumping rope.  · Aerobic dancing.  How can I be more active in my day-to-day activities?  · Use the stairs instead of the elevator.  · Take a walk during your  lunch break.  · If you drive, park your car farther away from work or school.  · If you take public transportation, get off one stop early and walk the rest of the way.  · Make all of your phone calls while standing up and walking around.  · Get up, stretch, and walk around every 30 minutes throughout the day.  What guidelines should I follow while exercising?  · Do not exercise so much that you hurt yourself, feel dizzy, or get very short of breath.  · Consult your health care provider prior to starting a new exercise program.  · Wear comfortable clothes and shoes with good support.  · Drink plenty of water while you exercise to prevent dehydration or heat stroke. Body water is lost during exercise and must be replaced.  · Work out until you breathe faster and your heart beats faster.  This information is not intended to replace advice given to you by your health care provider. Make sure you discuss any questions you have with your health care provider.  Document Released: 01/20/2012 Document Revised: 05/25/2017 Document Reviewed: 05/21/2015  [employer] Interactive Patient Education © 2019 [employer]    Calorie Counting for Weight Loss  Calories are units of energy. Your body needs a certain amount of calories from food to keep you going throughout the day. When you eat more calories than your body needs, your body stores the extra calories as fat. When you eat fewer calories than your body needs, your body burns fat to get the energy it needs.  Calorie counting means keeping track of how many calories you eat and drink each day. Calorie counting can be helpful if you need to lose weight. If you make sure to eat fewer calories than your body needs, you should lose weight. Ask your health care provider what a healthy weight is for you.  For calorie counting to work, you will need to eat the right number of calories in a day in order to lose a healthy amount of weight per week. A dietitian can help you determine how  many calories you need in a day and will give you suggestions on how to reach your calorie goal.  · A healthy amount of weight to lose per week is usually 1-2 lb (0.5-0.9 kg). This usually means that your daily calorie intake should be reduced by 500-750 calories.  · Eating 1,200 - 1,500 calories per day can help most women lose weight.  · Eating 1,500 - 1,800 calories per day can help most men lose weight.    What is my plan?  My goal is to have __________ calories per day.  If I have this many calories per day, I should lose around __________ pounds per week.  What do I need to know about calorie counting?  In order to meet your daily calorie goal, you will need to:  · Find out how many calories are in each food you would like to eat. Try to do this before you eat.  · Decide how much of the food you plan to eat.  · Write down what you ate and how many calories it had. Doing this is called keeping a food log.    To successfully lose weight, it is important to balance calorie counting with a healthy lifestyle that includes regular activity. Aim for 150 minutes of moderate exercise (such as walking) or 75 minutes of vigorous exercise (such as running) each week.  Where do I find calorie information?    The number of calories in a food can be found on a Nutrition Facts label. If a food does not have a Nutrition Facts label, try to look up the calories online or ask your dietitian for help.  Remember that calories are listed per serving. If you choose to have more than one serving of a food, you will have to multiply the calories per serving by the amount of servings you plan to eat. For example, the label on a package of bread might say that a serving size is 1 slice and that there are 90 calories in a serving. If you eat 1 slice, you will have eaten 90 calories. If you eat 2 slices, you will have eaten 180 calories.  How do I keep a food log?  Immediately after each meal, record the following information in your food  "log:  · What you ate. Don't forget to include toppings, sauces, and other extras on the food.  · How much you ate. This can be measured in cups, ounces, or number of items.  · How many calories each food and drink had.  · The total number of calories in the meal.    Keep your food log near you, such as in a small notebook in your pocket, or use a mobile pardeep or website. Some programs will calculate calories for you and show you how many calories you have left for the day to meet your goal.  What are some calorie counting tips?  · Use your calories on foods and drinks that will fill you up and not leave you hungry:  ? Some examples of foods that fill you up are nuts and nut butters, vegetables, lean proteins, and high-fiber foods like whole grains. High-fiber foods are foods with more than 5 g fiber per serving.  ? Drinks such as sodas, specialty coffee drinks, alcohol, and juices have a lot of calories, yet do not fill you up.  · Eat nutritious foods and avoid empty calories. Empty calories are calories you get from foods or beverages that do not have many vitamins or protein, such as candy, sweets, and soda. It is better to have a nutritious high-calorie food (such as an avocado) than a food with few nutrients (such as a bag of chips).  · Know how many calories are in the foods you eat most often. This will help you calculate calorie counts faster.  · Pay attention to calories in drinks. Low-calorie drinks include water and unsweetened drinks.  · Pay attention to nutrition labels for \"low fat\" or \"fat free\" foods. These foods sometimes have the same amount of calories or more calories than the full fat versions. They also often have added sugar, starch, or salt, to make up for flavor that was removed with the fat.  · Find a way of tracking calories that works for you. Get creative. Try different apps or programs if writing down calories does not work for you.  What are some portion control tips?  · Know how many " calories are in a serving. This will help you know how many servings of a certain food you can have.  · Use a measuring cup to measure serving sizes. You could also try weighing out portions on a kitchen scale. With time, you will be able to estimate serving sizes for some foods.  · Take some time to put servings of different foods on your favorite plates, bowls, and cups so you know what a serving looks like.  · Try not to eat straight from a bag or box. Doing this can lead to overeating. Put the amount you would like to eat in a cup or on a plate to make sure you are eating the right portion.  · Use smaller plates, glasses, and bowls to prevent overeating.  · Try not to multitask (for example, watch TV or use your computer) while eating. If it is time to eat, sit down at a table and enjoy your food. This will help you to know when you are full. It will also help you to be aware of what you are eating and how much you are eating.  What are tips for following this plan?  Reading food labels  · Check the calorie count compared to the serving size. The serving size may be smaller than what you are used to eating.  · Check the source of the calories. Make sure the food you are eating is high in vitamins and protein and low in saturated and trans fats.  Shopping  · Read nutrition labels while you shop. This will help you make healthy decisions before you decide to purchase your food.  · Make a grocery list and stick to it.  Cooking  · Try to cook your favorite foods in a healthier way. For example, try baking instead of frying.  · Use low-fat dairy products.  Meal planning  · Use more fruits and vegetables. Half of your plate should be fruits and vegetables.  · Include lean proteins like poultry and fish.  How do I count calories when eating out?  · Ask for smaller portion sizes.  · Consider sharing an entree and sides instead of getting your own entree.  · If you get your own entree, eat only half. Ask for a box at the  beginning of your meal and put the rest of your entree in it so you are not tempted to eat it.  · If calories are listed on the menu, choose the lower calorie options.  · Choose dishes that include vegetables, fruits, whole grains, low-fat dairy products, and lean protein.  · Choose items that are boiled, broiled, grilled, or steamed. Stay away from items that are buttered, battered, fried, or served with cream sauce. Items labeled “crispy” are usually fried, unless stated otherwise.  · Choose water, low-fat milk, unsweetened iced tea, or other drinks without added sugar. If you want an alcoholic beverage, choose a lower calorie option such as a glass of wine or light beer.  · Ask for dressings, sauces, and syrups on the side. These are usually high in calories, so you should limit the amount you eat.  · If you want a salad, choose a garden salad and ask for grilled meats. Avoid extra toppings like cody, cheese, or fried items. Ask for the dressing on the side, or ask for olive oil and vinegar or lemon to use as dressing.  · Estimate how many servings of a food you are given. For example, a serving of cooked rice is ½ cup or about the size of half a baseball. Knowing serving sizes will help you be aware of how much food you are eating at restaurants. The list below tells you how big or small some common portion sizes are based on everyday objects:  ? 1 oz--4 stacked dice.  ? 3 oz--1 deck of cards.  ? 1 tsp--1 die.  ? 1 Tbsp--½ a ping-pong ball.  ? 2 Tbsp--1 ping-pong ball.  ? ½ cup--½ baseball.  ? 1 cup--1 baseball.  Summary  · Calorie counting means keeping track of how many calories you eat and drink each day. If you eat fewer calories than your body needs, you should lose weight.  · A healthy amount of weight to lose per week is usually 1-2 lb (0.5-0.9 kg). This usually means reducing your daily calorie intake by 500-750 calories.  · The number of calories in a food can be found on a Nutrition Facts label. If a  food does not have a Nutrition Facts label, try to look up the calories online or ask your dietitian for help.  · Use your calories on foods and drinks that will fill you up, and not on foods and drinks that will leave you hungry.  · Use smaller plates, glasses, and bowls to prevent overeating.  This information is not intended to replace advice given to you by your health care provider. Make sure you discuss any questions you have with your health care provider.  Document Released: 12/18/2006 Document Revised: 11/17/2017 Document Reviewed: 11/17/2017  [employer] Interactive Patient Education © 2019 [employer]

## 2019-05-30 ENCOUNTER — TELEPHONE (OUTPATIENT)
Dept: INTERNAL MEDICINE | Facility: CLINIC | Age: 19
End: 2019-05-30

## 2019-05-30 DIAGNOSIS — E55.9 VITAMIN D DEFICIENCY: Primary | ICD-10-CM

## 2019-05-30 RX ORDER — CHOLECALCIFEROL (VITAMIN D3) 1250 MCG
50000 CAPSULE ORAL
Qty: 8 CAPSULE | Refills: 0 | Status: SHIPPED | OUTPATIENT
Start: 2019-05-30 | End: 2019-07-19

## 2019-05-30 NOTE — TELEPHONE ENCOUNTER
----- Message from KANWAL Arevalo sent at 5/30/2019  8:12 AM EDT -----  Please let patient know that labs show that she is significantly low on vitamin D.  I will send in prescription vitamin D for her to take once a week for for 8 weeks.  After she completes that she can take vitamin D3 1000 units over-the-counter daily.  We should recheck a vitamin D level in about 3 months  B12 is also slightly low.  I would recommend she take vitamin B12 1000 mcg over-the-counter daily.    Other labs are within acceptable ranges

## 2020-01-20 ENCOUNTER — OFFICE VISIT (OUTPATIENT)
Dept: INTERNAL MEDICINE | Facility: CLINIC | Age: 20
End: 2020-01-20

## 2020-01-20 VITALS
TEMPERATURE: 97 F | BODY MASS INDEX: 44.41 KG/M2 | SYSTOLIC BLOOD PRESSURE: 136 MMHG | RESPIRATION RATE: 20 BRPM | HEIGHT: 68 IN | HEART RATE: 75 BPM | OXYGEN SATURATION: 100 % | DIASTOLIC BLOOD PRESSURE: 78 MMHG | WEIGHT: 293 LBS

## 2020-01-20 DIAGNOSIS — F40.298 PHONOPHOBIA: Primary | ICD-10-CM

## 2020-01-20 DIAGNOSIS — H65.93 FLUID LEVEL BEHIND TYMPANIC MEMBRANE OF BOTH EARS: ICD-10-CM

## 2020-01-20 DIAGNOSIS — R04.0 EPISTAXIS, RECURRENT: ICD-10-CM

## 2020-01-20 DIAGNOSIS — IMO0002 CHRONIC MIGRAINE: ICD-10-CM

## 2020-01-20 PROCEDURE — 99213 OFFICE O/P EST LOW 20 MIN: CPT | Performed by: NURSE PRACTITIONER

## 2020-01-20 RX ORDER — ECHINACEA PURPUREA EXTRACT 125 MG
1 TABLET ORAL AS NEEDED
Qty: 15 ML | Refills: 12 | Status: SHIPPED | OUTPATIENT
Start: 2020-01-20

## 2020-01-20 NOTE — PROGRESS NOTES
"Subjective   Abisai Jean is a 19 y.o. female.     Chief Complaint   Patient presents with   • phonophobia     noise sensitivity, chronic migraines, sees a neurologist, people's voices are \"amplified\" and couldn't get into her neurologist for awhile      Patient is here today with complaints of phonophobia and chronic migraines.  Phonophobia or noise sensitivity is occurring without migraines.  She is followed by Dr. Marroquin  with neurology.  She uses Imitrex as needed.  She has tried Tylenol and ibuprofen over-the-counter when she notes noise sensitivity.  She reports that she is just sensitive to noise.  There is no particular pain or tendinitis.  She reports that as a child she had noise sensitivity and had tubes in her ears.  She denies any ear pain, fullness, or any upper respiratory symptoms.  She also has been having some nosebleeds.  Reports these have been occurring for years and are random.  She does not have a history of uncontrolled hypertension.  She is not currently using any allergy medications.  Migraine    This is a chronic problem. The pain quality is similar to prior headaches. The quality of the pain is described as pulsating. Associated symptoms include phonophobia. Pertinent negatives include no abdominal pain, abnormal behavior, back pain, blurred vision, coughing, drainage, ear pain, eye pain, eye redness, eye watering, facial sweating, fever, hearing loss (Does complain of noise sensitivity.  ), insomnia, loss of balance, muscle aches, nausea, numbness, photophobia, rhinorrhea, scalp tenderness, seizures, sinus pressure, tingling, tinnitus, visual change, vomiting, weakness or weight loss. The symptoms are aggravated by noise. She has tried acetaminophen and NSAIDs for the symptoms. The treatment provided significant relief. Her past medical history is significant for migraine headaches.        The following portions of the patient's history were reviewed and updated as appropriate: " allergies, current medications, past family history, past medical history, past social history, past surgical history and problem list.    Review of Systems   Constitutional: Negative for activity change, appetite change, chills, diaphoresis, fatigue, fever, unexpected weight change and weight loss.   HENT: Positive for nosebleeds (ongoing for years). Negative for congestion, dental problem, ear discharge, ear pain, hearing loss (Does complain of noise sensitivity.  ), rhinorrhea, sinus pressure, sinus pain, sneezing, tinnitus and trouble swallowing.    Eyes: Negative for blurred vision, photophobia, pain, discharge, redness, itching and visual disturbance.   Respiratory: Negative for cough, chest tightness and shortness of breath.    Cardiovascular: Negative for chest pain.   Gastrointestinal: Negative for abdominal pain, nausea and vomiting.   Musculoskeletal: Negative for arthralgias, back pain and myalgias.   Allergic/Immunologic: Negative for environmental allergies.   Neurological: Negative for tingling, seizures, weakness, numbness and loss of balance.   Hematological: Negative for adenopathy. Does not bruise/bleed easily.   Psychiatric/Behavioral: Negative for self-injury. The patient does not have insomnia.        Outpatient Medications Marked as Taking for the 1/20/20 encounter (Office Visit) with Cristal Zafar APRN   Medication Sig Dispense Refill   • ondansetron (ZOFRAN) 4 MG tablet Take 1 tablet by mouth Every 6 (Six) Hours As Needed for Nausea or Vomiting. 15 tablet 0   • SUMAtriptan (IMITREX) 25 MG tablet Take one tablet at onset of headache. May repeat dose one time in 2 hours if headache not relieved. 9 tablet 0           Objective   Physical Exam   Constitutional: She is oriented to person, place, and time. She appears well-developed and well-nourished.   HENT:   Head: Normocephalic and atraumatic.   Nose: No epistaxis. Right sinus exhibits no maxillary sinus tenderness and no frontal sinus  "tenderness. Left sinus exhibits no maxillary sinus tenderness and no frontal sinus tenderness.   Nasal mucosa dry and erythematous, no epistaxis noted   TMs dull bilaterally.    Eyes: Pupils are equal, round, and reactive to light. Conjunctivae are normal.   Neck: Normal range of motion.   Cardiovascular: Normal rate, regular rhythm and normal heart sounds.   Pulmonary/Chest: Effort normal and breath sounds normal.   Abdominal: Soft. Normal appearance and bowel sounds are normal. There is no tenderness.   Musculoskeletal: Normal range of motion.   Neurological: She is alert and oriented to person, place, and time.   Skin: Skin is warm and dry.   Psychiatric: She has a normal mood and affect. Her behavior is normal. Judgment and thought content normal.       Vitals:    01/20/20 0955   BP: 136/78   BP Location: Right arm   Cuff Size: Large Adult   Pulse: 75   Resp: 20   Temp: 97 °F (36.1 °C)   SpO2: 100%   Weight: 135 kg (297 lb 6.4 oz)   Height: 173.7 cm (68.4\")     Body mass index is 44.69 kg/m².        Assessment/Plan   Abisai was seen today for phonophobia.    Diagnoses and all orders for this visit:    Phonophobia  -     Ambulatory Referral to ENT (Otolaryngology)    Fluid level behind tympanic membrane of both ears    Epistaxis, recurrent  -     Ambulatory Referral to ENT (Otolaryngology)  -     sodium chloride (OCEAN NASAL SPRAY) 0.65 % nasal spray; 1 spray into the nostril(s) as directed by provider As Needed for Congestion.    Chronic migraine      Patient noted to have a small amount of fluid behind tympanic membranes bilaterally and some mild erythema and dryness to nasal mucosa.  She otherwise has negative exam.  I have encouraged her to add saline nasal spray daily and we will refer her to ear nose throat specialist.  She should also schedule follow-up with her neurologist to discuss her chronic migraines.  She does use as needed Imitrex and I agree with this for her migraine control.  She can continue to " use over-the-counter Tylenol or ibuprofen sparingly as this is been effective with her noise sensitivity       Return for Annual due after 5/29.  I discussed my findings and recommendations with patient.  The plan of care was  discussed with patient. They verbalized understanding and agreement.  Patient was encouraged to keep me informed of any acute changes, lack of improvement, or any new concerning symptoms.       * Please note that portions of this note were completed with a voice recognition program. Efforts were made to edit the dictation but occasionally words are erroneously transcribed.

## 2021-01-19 ENCOUNTER — TELEMEDICINE (OUTPATIENT)
Dept: INTERNAL MEDICINE | Facility: CLINIC | Age: 21
End: 2021-01-19

## 2021-01-19 DIAGNOSIS — R07.89 CHEST WALL DISCOMFORT: Primary | ICD-10-CM

## 2021-01-19 PROCEDURE — 99213 OFFICE O/P EST LOW 20 MIN: CPT | Performed by: NURSE PRACTITIONER

## 2021-01-19 NOTE — PROGRESS NOTES
You have chosen to receive care through a telehealth visit.  Do you consent to use a video/audio connection for your medical care today? Yes  This was an audio and video enabled telemedicine encounter.    Subjective   Abisai Jean is a 20 y.o. female.     Chief Complaint   Patient presents with   • Chest Pain     spasms in left chest       Chest Pain   This is a new problem. The current episode started 1 to 4 weeks ago. The onset quality is gradual. The problem occurs intermittently. The problem has been waxing and waning. The pain is present in the lateral region. The pain is mild. Quality: spasm  The pain does not radiate. Associated symptoms include palpitations. Pertinent negatives include no abdominal pain, back pain, claudication, cough, dizziness, exertional chest pressure, fever, hemoptysis, irregular heartbeat, lower extremity edema, nausea, numbness, orthopnea, shortness of breath, sputum production, vomiting or weakness. The pain is aggravated by nothing. She has tried nothing for the symptoms. The treatment provided no relief. Risk factors include obesity and lack of exercise.   Pertinent negatives for past medical history include no COPD. Past medical history comments: asthma   Her family medical history is significant for CAD, diabetes and hypertension.   Pertinent negatives for family medical history include: no early MI, no PE, no sudden death and no TIA.    has pain to her left chest that she feels like are muscle spasms. Cannot lay on her left due to spasms.   Feels SOA with minimal exertion but this is not new  Is obese.   No fever, cough.   She does have some chest tightness and palpitations.    no history of  COPD. Has asthma as a child but no issues as an adult  She denies any injury or trauma.      The following portions of the patient's history were reviewed and updated as appropriate: allergies, current medications, past family history, past medical history, past social history, past  surgical history and problem list.        Review of Systems   Constitutional: Positive for fatigue. Negative for fever and unexpected weight loss.   Eyes: Negative for blurred vision, double vision and visual disturbance.   Respiratory: Negative for cough, hemoptysis, sputum production, shortness of breath and wheezing.    Cardiovascular: Positive for chest pain (chest wall spasms) and palpitations. Negative for orthopnea, claudication and leg swelling.   Gastrointestinal: Negative for abdominal pain, constipation, diarrhea, nausea and vomiting.   Genitourinary: Negative for difficulty urinating, frequency and urgency.   Musculoskeletal: Negative for arthralgias, back pain and myalgias.   Skin: Negative for color change and rash.   Neurological: Negative for dizziness, weakness, numbness and headache.   Hematological: Negative for adenopathy. Does not bruise/bleed easily.       No outpatient medications have been marked as taking for the 1/19/21 encounter (Telemedicine) with Cristal Zafar APRN.     No Known Allergies        Objective   Physical Exam   Constitutional: She is oriented to person, place, and time. She appears well-developed and well-nourished. She appears obese.  Eyes: Conjunctivae, EOM and lids are normal.   Neck: Neck normal appearance.  Cardiovascular: Normal pulse (patient directed exam).      Tenderness to left chest wall. No visible abnormalities   Pulmonary/Chest: Effort normal.  No respiratory distress. She no audible wheeze...  Abdominal: Abdomen appears normal. She exhibits no distension.   Neurological: She is alert and oriented to person, place, and time.   Skin: Skin is dry. No erythema. No pallor.   Psychiatric: Her speech is normal and behavior is normal. Judgment and thought content normal. She mood appears anxious. Her affect is not flattened. She does not exhibit a depressed mood. She is attentive. Patient is not hallucinating.        There were no vitals filed for this  visit.  There is no height or weight on file to calculate BMI.        Assessment/Plan   Diagnoses and all orders for this visit:    1. Chest wall discomfort (Primary)     encouraged pt to come to office or go to ER for further evaluation: she is willing to come in this Friday. To ER if CP. Appt made for 2 pm          I discussed my findings,recommendations, and plan of care was with the patient. They verbalized understanding and agreement.  Patient was encouraged to keep me informed of any acute changes, lack of improvement, or any new concerning symptoms.       * Please note that portions of this note were completed with a voice recognition program. Efforts were made to edit the dictation but occasionally words are erroneously transcribed.

## 2021-08-17 ENCOUNTER — LAB (OUTPATIENT)
Dept: LAB | Facility: HOSPITAL | Age: 21
End: 2021-08-17

## 2021-08-17 ENCOUNTER — OFFICE VISIT (OUTPATIENT)
Dept: INTERNAL MEDICINE | Facility: CLINIC | Age: 21
End: 2021-08-17

## 2021-08-17 VITALS
RESPIRATION RATE: 20 BRPM | DIASTOLIC BLOOD PRESSURE: 74 MMHG | TEMPERATURE: 97.3 F | SYSTOLIC BLOOD PRESSURE: 132 MMHG | HEIGHT: 69 IN | HEART RATE: 107 BPM | BODY MASS INDEX: 42.78 KG/M2 | WEIGHT: 288.8 LBS | OXYGEN SATURATION: 98 %

## 2021-08-17 DIAGNOSIS — Z02.89 ENCOUNTER FOR PHYSICAL EXAMINATION RELATED TO EMPLOYMENT: Primary | ICD-10-CM

## 2021-08-17 DIAGNOSIS — Z23 IMMUNIZATION DUE: ICD-10-CM

## 2021-08-17 PROCEDURE — 86481 TB AG RESPONSE T-CELL SUSP: CPT | Performed by: STUDENT IN AN ORGANIZED HEALTH CARE EDUCATION/TRAINING PROGRAM

## 2021-08-17 PROCEDURE — 36415 COLL VENOUS BLD VENIPUNCTURE: CPT | Performed by: STUDENT IN AN ORGANIZED HEALTH CARE EDUCATION/TRAINING PROGRAM

## 2021-08-17 PROCEDURE — 99214 OFFICE O/P EST MOD 30 MIN: CPT | Performed by: STUDENT IN AN ORGANIZED HEALTH CARE EDUCATION/TRAINING PROGRAM

## 2021-08-17 NOTE — PROGRESS NOTES
Chief Complaint   Patient presents with   • TB Test       SUBJECTIVE:  Nallely Jean is a 21 y.o. female who presents for a pre-employment physical exam.    Patient has the following concerns: Patient has no concerns today.  She is feeling well today.  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    She has not had any known sick contacts or recent foreign travel.    She has had no prior TB infection or positive TB test.    She currently lives at home with her mother.    Patient is not vaccinated against COVID-19.  She states that she is afraid of needles.    Past Medical History:   Diagnosis Date   • Asthma     as a child   • History of blood transfusion     after heavy menses   • Menorrhagia     in high school   • Migraines        Past Surgical History:   Procedure Laterality Date   • ADENOIDECTOMY     • DENTAL PROCEDURE      [place] teeth   • EAR TUBES     • TONSILLECTOMY         Immunization History   Administered Date(s) Administered   • DTaP 1998, 1998, 01/11/1999, 08/11/1999, 09/16/1999, 07/29/2003   • Hep A, Unspecified 02/11/2016   • Hep B, Unspecified 1998, 1998, 06/17/1999, 08/11/1999   • HiB 1998, 1998, 06/17/1999, 08/11/1999   • Hpv9 02/11/2016   • IPV 1998, 1998, 1998, 07/29/2003   • Influenza, Unspecified 01/01/2019   • MMR 09/16/1999, 07/29/2003   • Meningococcal Conjugate 02/11/2016   • Polio, Unspecified 1998, 06/17/1999, 08/11/1999, 07/29/2003   • Tdap 02/11/2016   • Varicella 06/17/1999, 02/11/2016       No Known Allergies      Current Outpatient Medications:   •  sodium chloride (OCEAN NASAL SPRAY) 0.65 % nasal spray, 1 spray into the nostril(s) as directed by provider As Needed for Congestion., Disp: 15 mL, Rfl: 12  •  ondansetron (ZOFRAN) 4 MG tablet, Take 1 tablet by mouth Every 6 (Six) Hours As Needed for Nausea or Vomiting., Disp: 15 tablet, Rfl: 0  •  SUMAtriptan (IMITREX) 25 MG tablet, Take one tablet at onset of headache. May  "repeat dose one time in 2 hours if headache not relieved., Disp: 9 tablet, Rfl: 0     Review of Systems   Constitutional: Negative for chills, fatigue, fever and unexpected weight change.   Respiratory: Negative for cough, chest tightness and shortness of breath.    Cardiovascular: Negative for chest pain.       OBJECTIVE  PHYSICAL EXAMINATION  /74   Pulse 107   Temp 97.3 °F (36.3 °C) (Temporal)   Resp 20   Ht 175.3 cm (69\")   Wt 131 kg (288 lb 12.8 oz)   LMP 08/01/2021 (Approximate)   SpO2 98%   BMI 42.65 kg/m²    Physical Exam  Vitals and nursing note reviewed.   Constitutional:       Appearance: Normal appearance. She is obese.   HENT:      Right Ear: Tympanic membrane, ear canal and external ear normal.      Left Ear: Tympanic membrane, ear canal and external ear normal.      Mouth/Throat:      Mouth: Mucous membranes are moist.      Pharynx: Oropharynx is clear.   Cardiovascular:      Rate and Rhythm: Normal rate and regular rhythm.      Heart sounds: Normal heart sounds.   Pulmonary:      Effort: Pulmonary effort is normal.      Breath sounds: Normal breath sounds.   Abdominal:      General: Bowel sounds are normal.      Palpations: Abdomen is soft.   Skin:     General: Skin is warm and dry.   Neurological:      Mental Status: She is alert and oriented to person, place, and time. Mental status is at baseline.   Psychiatric:         Mood and Affect: Mood normal.         Behavior: Behavior normal.             Diagnoses and all orders for this visit:    1. Encounter for physical examination related to employment (Primary)  -     TSPOT; Future  -     TSPOT    2. Immunization due  Comments:  Counseling provided on COVID 19 immunization         ASSESSMENT  Nallely is free of signs of communicable illness.    PLAN:    -T spot was done today.  Continue to follow-up with PCP    I spent a total of 45 minutes. Time was spent reviewing patient's chart, placing appropriate orders, and providing counseling " related to TB testing, work related safety, and COVID-19 safety and precautions.  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  All questions were answered.      Archie Edwards MD  08/17/21

## 2021-08-19 ENCOUNTER — TELEPHONE (OUTPATIENT)
Dept: INTERNAL MEDICINE | Facility: CLINIC | Age: 21
End: 2021-08-19

## 2021-08-19 LAB
TSPOT INTERPRETATION: NEGATIVE
TSPOT NIL CONTROL INTERPRETATION: NORMAL
TSPOT PANEL A: 0
TSPOT PANEL B: 0
TSPOT POS CONTROL INTERPRETATION: NORMAL

## 2021-08-19 NOTE — TELEPHONE ENCOUNTER
----- Message from Archie Edwards MD sent at 8/19/2021  1:47 PM EDT -----  T spot negative- normal results

## 2022-07-11 ENCOUNTER — TELEMEDICINE (OUTPATIENT)
Dept: INTERNAL MEDICINE | Facility: CLINIC | Age: 22
End: 2022-07-11

## 2022-07-11 DIAGNOSIS — D64.9 ACUTE ANEMIA: Primary | ICD-10-CM

## 2022-07-11 DIAGNOSIS — N92.0 MENORRHAGIA WITH REGULAR CYCLE: ICD-10-CM

## 2022-07-11 PROCEDURE — 99214 OFFICE O/P EST MOD 30 MIN: CPT | Performed by: NURSE PRACTITIONER

## 2022-07-11 NOTE — PROGRESS NOTES
Nallely Jean presents to [facility] for   You have chosen to receive care through a telehealth visit.  Do you consent to use a video/audio connection for your medical care today? Yes  Patient location: Home    Chief Complaint  Chief Complaint   Patient presents with   • Menorrhagia         Subjective        History of Present Illness    Nallely   Was seen at  ER 7/1/2022 for menorrhagia with irregular menses with chronic blood loss and required blood transfusion.  Her hemoglobin and hematocrit were found to be 6.9 and 23.1 respectively.  She was having fatigue, weakness, and dizziness.  She tells me that has now resolved and she has more energy now.   She was prescribed progesterone for about a week.  She tells me that her menses have now.  She does typically follow with gynecology.  They previously had her on birth control she has not taken this for some time.  She denies sexual activity.  She does have an appointment 7/21/2022 with  gynecology.       ER record and labs found in epic from 7/1/2022 reviewed by me during this visit with patient.    Review of Systems   Constitutional: Positive for fatigue. Negative for fever and unexpected weight loss.   Eyes: Negative for blurred vision, double vision and visual disturbance.   Respiratory: Negative for cough, shortness of breath and wheezing.    Cardiovascular: Negative for chest pain, palpitations and leg swelling.   Gastrointestinal: Negative for abdominal pain, constipation, diarrhea, nausea and vomiting.   Genitourinary: Positive for menstrual problem and vaginal bleeding ( Stopped). Negative for difficulty urinating, frequency and urgency.   Musculoskeletal: Negative for arthralgias and myalgias.   Skin: Negative for color change and rash.   Neurological: Positive for dizziness ( Resolved) and weakness ( resolved). Negative for headache.   Hematological: Negative for adenopathy. Does not bruise/bleed easily.         No  Known Allergies  Current Outpatient Medications on File Prior to Visit   Medication Sig Dispense Refill   • ondansetron (ZOFRAN) 4 MG tablet Take 1 tablet by mouth Every 6 (Six) Hours As Needed for Nausea or Vomiting. 15 tablet 0   • sodium chloride (OCEAN NASAL SPRAY) 0.65 % nasal spray 1 spray into the nostril(s) as directed by provider As Needed for Congestion. 15 mL 12     No current facility-administered medications on file prior to visit.         The following portions of the patient's history were reviewed and updated as appropriate: allergies, current medications, past family history, past medical history, past social history, past surgical history and problem list and are available for review within electronic record    Objective     Result Review :                    Vital Signs:   There were no vitals taken for this visit.        Physical Exam   Constitutional: She is oriented to person, place, and time. She appears well-developed and well-nourished. No distress.   HENT:   Head: Normocephalic and atraumatic.   Right Ear: External ear normal.   Left Ear: External ear normal.   Mouth/Throat: Oropharynx is clear and moist.   Eyes: Right eye exhibits no discharge. Left eye exhibits no discharge. No scleral icterus.   Neck: Neck normal appearance.  Cardiovascular: Normal pulse (patient directed exam).      Pulmonary/Chest: Effort normal. No accessory muscle usage.  No respiratory distress.No use of oxygen by nasal cannula  Abdominal: Abdomen appears normal.   Neurological: She is alert and oriented to person, place, and time.   Skin: Skin is dry. She is not diaphoretic. No erythema. No pallor.   Psychiatric: She has a normal mood and affect. Her speech is normal and behavior is normal. Judgment normal. She is attentive.            Assessment and Plan      Diagnoses and all orders for this visit:    1. Acute anemia (Primary)  -     CBC (No Diff); Future  -     Ferritin; Future  -     Iron Profile; Future    2.  Menorrhagia with regular cycle      She appears well and in no acute distress.  No pallor or conjunctival pallor.  She received blood transfusion at .  We will have her stop in the office tomorrow to have her blood counts and iron levels checked.  Will likely need iron supplement.  Can start once labs are back.  She should keep follow-up with gynecology.  Would likely recommend contraception for menstrual cycle regulation.  She will discuss this with gynecology later this month.    Follow Up     Patient was given instructions and counseling regarding her condition or for health maintenance advice. Please see specific information pulled into the AVS if appropriate.   Any new medication's adverse effects have been discussed in detail with patient  Patient was encouraged to keep me informed of any acute changes, lack of improvement, or any new concerning symptoms.    discussed limitations of a telehealth visit (such as lack of a physical exam and unable to obtain vital signs) and advised the patient that they may need to follow up for an office visit should their symptoms or concerns persist, worsen, or change.  Patient was encouraged to keep me informed of any acute changes, lack of improvement, or any new concerning symptoms.   I discussed my findings,recommendations, and plan of care was with the patient. They verbalized understanding and agreement.    Return in about 1 month (around 8/11/2022).          Dictated Utilizing Dragon Dictation   Please note that portions of this note were completed with a voice recognition program.   Part of this note may be an electronic transcription/translation of spoken language to printed text using the Dragon Dictation System.

## 2022-07-12 ENCOUNTER — LAB (OUTPATIENT)
Dept: LAB | Facility: HOSPITAL | Age: 22
End: 2022-07-12

## 2022-07-12 DIAGNOSIS — D64.9 ACUTE ANEMIA: ICD-10-CM

## 2022-07-12 PROCEDURE — 84466 ASSAY OF TRANSFERRIN: CPT | Performed by: NURSE PRACTITIONER

## 2022-07-12 PROCEDURE — 85027 COMPLETE CBC AUTOMATED: CPT | Performed by: NURSE PRACTITIONER

## 2022-07-12 PROCEDURE — 82728 ASSAY OF FERRITIN: CPT | Performed by: NURSE PRACTITIONER

## 2022-07-12 PROCEDURE — 83540 ASSAY OF IRON: CPT | Performed by: NURSE PRACTITIONER

## 2022-07-13 LAB
DEPRECATED RDW RBC AUTO: 49.4 FL (ref 37–54)
ERYTHROCYTE [DISTWIDTH] IN BLOOD BY AUTOMATED COUNT: 18 % (ref 12.3–15.4)
FERRITIN SERPL-MCNC: 13.5 NG/ML (ref 13–150)
HCT VFR BLD AUTO: 27.4 % (ref 34–46.6)
HGB BLD-MCNC: 7.8 G/DL (ref 12–15.9)
IRON 24H UR-MRATE: 21 MCG/DL (ref 37–145)
IRON SATN MFR SERPL: 4 % (ref 20–50)
MCH RBC QN AUTO: 22 PG (ref 26.6–33)
MCHC RBC AUTO-ENTMCNC: 28.5 G/DL (ref 31.5–35.7)
MCV RBC AUTO: 77.2 FL (ref 79–97)
PLATELET # BLD AUTO: 266 10*3/MM3 (ref 140–450)
PMV BLD AUTO: 10.7 FL (ref 6–12)
RBC # BLD AUTO: 3.55 10*6/MM3 (ref 3.77–5.28)
TIBC SERPL-MCNC: 511 MCG/DL (ref 298–536)
TRANSFERRIN SERPL-MCNC: 343 MG/DL (ref 200–360)
WBC NRBC COR # BLD: 7.57 10*3/MM3 (ref 3.4–10.8)

## 2022-07-16 DIAGNOSIS — D64.9 ACUTE ANEMIA: Primary | ICD-10-CM

## 2022-07-16 RX ORDER — DOXYCYCLINE HYCLATE 50 MG/1
324 CAPSULE, GELATIN COATED ORAL
Qty: 90 TABLET | Refills: 1 | Status: SHIPPED | OUTPATIENT
Start: 2022-07-16

## 2022-07-18 ENCOUNTER — TELEPHONE (OUTPATIENT)
Dept: INTERNAL MEDICINE | Facility: CLINIC | Age: 22
End: 2022-07-18

## 2022-07-18 NOTE — TELEPHONE ENCOUNTER
[facility] for the patient to return our call, office number was provided      HUB TO READ: Please provide the patient with the below provider comments.     ----- Message from KANWAL Arevalo sent at 7/18/2022  8:21 AM EDT -----  Your labs still do show significant anemia but this is improved compared to 2 weeks ago.  You need to be taking iron supplement 3 times a day and a recheck blood work in 1 month.  I sent iron prescription into your [facility] pharmacy on nasal chloride.  Please call the office to schedule follow-up for 1 month

## 2022-07-20 NOTE — TELEPHONE ENCOUNTER
[facility] for the patient to return our call, office number was provided       HUB TO READ: Please provide the patient with the below provider comments.      ----- Message from KANWAL Arevalo sent at 7/18/2022  8:21 AM EDT -----  Your labs still do show significant anemia but this is improved compared to 2 weeks ago.  You need to be taking iron supplement 3 times a day and a recheck blood work in 1 month.  I sent iron prescription into your [facility] pharmacy on nasal chloride.  Please call the office to schedule follow-up for 1 month

## 2022-08-10 ENCOUNTER — TELEMEDICINE (OUTPATIENT)
Dept: INTERNAL MEDICINE | Facility: CLINIC | Age: 22
End: 2022-08-10

## 2022-08-10 DIAGNOSIS — D64.9 ACUTE ANEMIA: Primary | ICD-10-CM

## 2022-08-10 DIAGNOSIS — N92.0 MENORRHAGIA WITH REGULAR CYCLE: ICD-10-CM

## 2022-08-10 PROCEDURE — 99213 OFFICE O/P EST LOW 20 MIN: CPT | Performed by: NURSE PRACTITIONER

## 2022-08-10 NOTE — PROGRESS NOTES
This was an audio and video enabled visit.   This provider is located at the [facility] ([facility] [address], [place] [address] using a secure Veeqo Video Visit through EPIC. Nallely  is being seen remotely via telehealth at their  home address in [place], and stated they are in a secure environment for this session. Nallely  condition being diagnosed/treated is felt to be appropriate for telemedicine. Nallely may be asked to present for in office testing and/or evaluation if felt to be unsafe for telemedicine.  The provider identified herself as well as her credentials. The patient consents to be seen remotely, and when consent is given they understand that the consent allows for patient identifiable information to be sent to a third party as needed. They may refuse to be seen remotely at any time. The electronic data is encrypted and password protected, and the patient has been advised of the potential risks to privacy not withstanding such measures.  You have chosen to receive care through a telehealth visit. Do you consent to use a video/audio connection for your medical care today? Yes      Subjective   Nallely Jean is a 22 y.o. female.     Chief Complaint   Patient presents with   • Anemia       History of Present Illness     Nallely presents for menorrhagia and subsequent anemia.  She is following with  gynecology who has started her on contraception.  She is still experiencing vaginal bleeding despite starting birth control.  However it is much less than it was before.   She did previously require blood transfusionFor hemoglobin and hematocrit 6.9 and 23.1     she is not taking the ferrous gluconate. It was causing nausea and vomiting.   She thinks she got dehydrated with this. She is not taking now.         The following portions of the patient's history were reviewed and updated as appropriate: allergies, current medications, past  family history, past medical history, past social history, past surgical history and problem list.        Review of Systems        No outpatient medications have been marked as taking for the 8/10/22 encounter (Telemedicine) with Cristal ZafarKANWAL.     No Known Allergies        Objective   Physical Exam   Constitutional: She is oriented to person, place, and time. She appears well-developed and well-nourished. No distress.   HENT:   Head: Normocephalic and atraumatic.   Right Ear: External ear normal.   Left Ear: External ear normal.   Mouth/Throat: Oropharynx is clear and moist.   Eyes: Right eye exhibits no discharge. Left eye exhibits no discharge. No scleral icterus.   Neck: Neck normal appearance.  Pulmonary/Chest: Effort normal. No accessory muscle usage.  No respiratory distress.No use of oxygen by nasal cannula  Abdominal: Abdomen appears normal.   Neurological: She is alert and oriented to person, place, and time.   Skin: Skin is dry. She is not diaphoretic. No erythema. No pallor.   Psychiatric: She has a normal mood and affect. Her speech is normal and behavior is normal. Judgment normal. She is attentive.         There were no vitals filed for this visit.  There is no height or weight on file to calculate BMI.        Assessment & Plan   Diagnoses and all orders for this visit:    1. Acute anemia (Primary)  -     CBC (No Diff); Future  -     Ferritin; Future    2. Menorrhagia with regular cycle         She appears well and in NAD.  She should continue to follow-up gynecology for management of menorrhagia well and in no acute distress.  She did not tolerate the iron supplement.  We will recheck her CBC and ferritin and go from there. May need antiemetic if PO iron still needed         Return in about 3 months (around 11/10/2022) for and needs to return for labs within 1-2 weeks.    I have reviewed the limitations of a telehealth visit (such as lack of a physical exam and unable to obtain vital signs)  and advised the patient that they may need to follow up for an office visit should their symptoms or concerns persist, worsen, or change.  Patient was encouraged to keep me informed of any acute changes, lack of improvement, or any new concerning symptoms.   I discussed my findings,recommendations, and plan of care was with the patient. They verbalized understanding and agreement.    Dictated Utilizing Dragon Dictation   Please note that portions of this note were completed with a voice recognition program.   Part of this note may be an electronic transcription/translation of spoken language to printed text using the Dragon Dictation System.

## 2023-09-18 ENCOUNTER — LAB (OUTPATIENT)
Dept: INTERNAL MEDICINE | Facility: CLINIC | Age: 23
End: 2023-09-18
Payer: COMMERCIAL

## 2023-09-18 ENCOUNTER — OFFICE VISIT (OUTPATIENT)
Dept: INTERNAL MEDICINE | Facility: CLINIC | Age: 23
End: 2023-09-18
Payer: COMMERCIAL

## 2023-09-18 VITALS
WEIGHT: 287.6 LBS | HEIGHT: 69 IN | TEMPERATURE: 97.3 F | SYSTOLIC BLOOD PRESSURE: 122 MMHG | DIASTOLIC BLOOD PRESSURE: 76 MMHG | BODY MASS INDEX: 42.6 KG/M2 | RESPIRATION RATE: 18 BRPM | OXYGEN SATURATION: 99 % | HEART RATE: 88 BPM

## 2023-09-18 DIAGNOSIS — D50.0 IRON DEFICIENCY ANEMIA DUE TO CHRONIC BLOOD LOSS: Primary | ICD-10-CM

## 2023-09-18 DIAGNOSIS — G43.009 MIGRAINE WITHOUT AURA AND WITHOUT STATUS MIGRAINOSUS, NOT INTRACTABLE: ICD-10-CM

## 2023-09-18 DIAGNOSIS — D50.0 IRON DEFICIENCY ANEMIA DUE TO CHRONIC BLOOD LOSS: ICD-10-CM

## 2023-09-18 DIAGNOSIS — N92.0 MENORRHAGIA WITH REGULAR CYCLE: ICD-10-CM

## 2023-09-18 LAB
DEPRECATED RDW RBC AUTO: 43.6 FL (ref 37–54)
ERYTHROCYTE [DISTWIDTH] IN BLOOD BY AUTOMATED COUNT: 17.2 % (ref 12.3–15.4)
HCT VFR BLD AUTO: 30.8 % (ref 34–46.6)
HGB BLD-MCNC: 9.1 G/DL (ref 12–15.9)
MCH RBC QN AUTO: 21.1 PG (ref 26.6–33)
MCHC RBC AUTO-ENTMCNC: 29.5 G/DL (ref 31.5–35.7)
MCV RBC AUTO: 71.3 FL (ref 79–97)
PLATELET # BLD AUTO: 315 10*3/MM3 (ref 140–450)
PMV BLD AUTO: 9.8 FL (ref 6–12)
RBC # BLD AUTO: 4.32 10*6/MM3 (ref 3.77–5.28)
WBC NRBC COR # BLD: 8.08 10*3/MM3 (ref 3.4–10.8)

## 2023-09-18 PROCEDURE — 1160F RVW MEDS BY RX/DR IN RCRD: CPT | Performed by: NURSE PRACTITIONER

## 2023-09-18 PROCEDURE — 85027 COMPLETE CBC AUTOMATED: CPT | Performed by: NURSE PRACTITIONER

## 2023-09-18 PROCEDURE — 83540 ASSAY OF IRON: CPT | Performed by: NURSE PRACTITIONER

## 2023-09-18 PROCEDURE — 99214 OFFICE O/P EST MOD 30 MIN: CPT | Performed by: NURSE PRACTITIONER

## 2023-09-18 PROCEDURE — 80053 COMPREHEN METABOLIC PANEL: CPT | Performed by: NURSE PRACTITIONER

## 2023-09-18 PROCEDURE — 82728 ASSAY OF FERRITIN: CPT | Performed by: NURSE PRACTITIONER

## 2023-09-18 PROCEDURE — 36415 COLL VENOUS BLD VENIPUNCTURE: CPT | Performed by: NURSE PRACTITIONER

## 2023-09-18 PROCEDURE — 1159F MED LIST DOCD IN RCRD: CPT | Performed by: NURSE PRACTITIONER

## 2023-09-18 PROCEDURE — 84466 ASSAY OF TRANSFERRIN: CPT | Performed by: NURSE PRACTITIONER

## 2023-09-18 NOTE — PROGRESS NOTES
Subjective   Nallely Jean is a 23 y.o. female.     Chief Complaint   Patient presents with    Migraine     Pt has occasional migraines that come and go    Anemia     Pt would like to check her iron levels        PCP: Cristal Zafar APRN    History of Present Illness     The patient presents today for a follow up on migraines and iron deficiency anemia. She is not currently taking an iron supplement. Her last CBC was completed on 08/29/2022 and her hemoglobin, hematocrit were 7.3 and 26.5 at that time.    The patient is on birth control. She notes her periods have been improving since they were causing her a lot of blood loss before. She denies feeling lightheaded, dizziness, or short of breath.    She last saw her gynecologist, KANWAL Reyes on 09/14/2023. She states her menses is lasting approximately 3 days. The patient will be having a pap smear at her next gynecology appointment.    The patient is having migraines occasionally every couple of months. She is utilizing over-the-counter pain reliever that is helping.     The patient denies a flu vaccine at this time.    CBC AND DIFFERENTIAL (08/29/2022 23:08)   COMPREHENSIVE METABOLIC PANEL (08/29/2022 23:08)     The following portions of the patient's history were reviewed and updated as appropriate: allergies, current medications, past family history, past medical history, past social history, past surgical history and problem list.        Review of Systems   Constitutional:  Negative for fatigue, fever and unexpected weight loss.   Eyes:  Negative for blurred vision, double vision and visual disturbance.   Respiratory:  Negative for cough, shortness of breath and wheezing.    Cardiovascular:  Negative for chest pain, palpitations and leg swelling.   Gastrointestinal:  Negative for abdominal pain, constipation, diarrhea, nausea and vomiting.   Genitourinary:  Positive for menstrual problem. Negative for difficulty urinating, frequency and urgency.  "  Musculoskeletal:  Negative for arthralgias and myalgias.   Skin:  Negative for color change and rash.   Neurological:  Negative for dizziness, weakness and headache.   Hematological:  Negative for adenopathy. Does not bruise/bleed easily.         No outpatient medications have been marked as taking for the 9/18/23 encounter (Office Visit) with Cristal Zafar APRYAW.     No Known Allergies        Objective   Physical Exam  Constitutional:       Appearance: Normal appearance. She is well-developed. She is morbidly obese.   HENT:      Head: Normocephalic and atraumatic.   Eyes:      General: No scleral icterus.     Conjunctiva/sclera: Conjunctivae normal.   Cardiovascular:      Rate and Rhythm: Normal rate and regular rhythm.      Heart sounds: Normal heart sounds.   Pulmonary:      Effort: Pulmonary effort is normal. No respiratory distress.      Breath sounds: Normal breath sounds.   Abdominal:      General: Bowel sounds are normal.      Palpations: Abdomen is soft.      Tenderness: There is no abdominal tenderness.   Musculoskeletal:         General: Normal range of motion.      Cervical back: Normal range of motion.      Right lower leg: No edema.      Left lower leg: No edema.   Skin:     General: Skin is warm and dry.   Neurological:      General: No focal deficit present.      Mental Status: She is alert and oriented to person, place, and time.   Psychiatric:         Attention and Perception: Attention normal.         Mood and Affect: Mood and affect normal.         Behavior: Behavior normal. Behavior is cooperative.         Thought Content: Thought content normal.         Cognition and Memory: Cognition normal.         Judgment: Judgment normal.         Vitals:    09/18/23 1059   BP: 122/76   BP Location: Right arm   Patient Position: Sitting   Cuff Size: Large Adult   Pulse: 88   Resp: 18   Temp: 97.3 °F (36.3 °C)   TempSrc: Infrared   SpO2: 99%   Weight: 130 kg (287 lb 9.6 oz)   Height: 175.3 cm (69\") "     Body mass index is 42.47 kg/m².  Wt Readings from Last 3 Encounters:   09/18/23 130 kg (287 lb 9.6 oz)   08/17/21 131 kg (288 lb 12.8 oz)   01/20/20 135 kg (297 lb 6.4 oz)             Assessment & Plan   Diagnoses and all orders for this visit:    1. Iron deficiency anemia due to chronic blood loss (Primary)  -     CBC (No Diff); Future  -     Iron Profile; Future  -     Ferritin; Future  -     Comprehensive Metabolic Panel; Future    2. Menorrhagia with regular cycle  -     CBC (No Diff); Future  -     Iron Profile; Future  -     Ferritin; Future  -     Comprehensive Metabolic Panel; Future    3. Migraine without aura and without status migrainosus, not intractable  -     CBC (No Diff); Future  -     Iron Profile; Future  -     Ferritin; Future  -     Comprehensive Metabolic Panel; Future    Iron deficiency anemia due to chronic blood loss secondary to menorrhagia.  Menorrhagia has improved with birth control  And she will continue to follow with gynecology for this and is plan on setting up her Pap for them.  We will recheck labs today.  Migraines are occurring rarely and she is responding well to over-the-counter medications.  We will continue this plan if migraines are worsening she will follow-up with me and let me know  Class 3 Severe Obesity (BMI >=40). Obesity-related health conditions include the following: none. Obesity is unchanged. BMI is is above average; BMI management plan is completed. We discussed low calorie, low carb based diet program, portion control, and increasing exercise.      Return in about 3 months (around 12/18/2023) for Annual, and need to collect labs today.    I discussed my findings,recommendations, and plan of care was with the patient. They verbalized understanding and agreement.  Patient was encouraged to keep me informed of any acute changes, lack of improvement, or any new concerning symptoms.       Transcribed from ambient dictation for KANWAL Thomas by Madhavi  Janet.  09/18/23   12:43 EDT    Patient or patient representative verbalized consent to the visit recording.  I have personally performed the services described in this document as transcribed by the above individual, and it is both accurate and complete.  Cristal Zafar, APRN  9/19/2023  16:18 EDT

## 2023-09-19 LAB
ALBUMIN SERPL-MCNC: 4.3 G/DL (ref 3.5–5.2)
ALBUMIN/GLOB SERPL: 1.5 G/DL
ALP SERPL-CCNC: 102 U/L (ref 39–117)
ALT SERPL W P-5'-P-CCNC: 9 U/L (ref 1–33)
ANION GAP SERPL CALCULATED.3IONS-SCNC: 13 MMOL/L (ref 5–15)
AST SERPL-CCNC: 17 U/L (ref 1–32)
BILIRUB SERPL-MCNC: <0.2 MG/DL (ref 0–1.2)
BUN SERPL-MCNC: 10 MG/DL (ref 6–20)
BUN/CREAT SERPL: 10.3 (ref 7–25)
CALCIUM SPEC-SCNC: 9.2 MG/DL (ref 8.6–10.5)
CHLORIDE SERPL-SCNC: 105 MMOL/L (ref 98–107)
CO2 SERPL-SCNC: 22 MMOL/L (ref 22–29)
CREAT SERPL-MCNC: 0.97 MG/DL (ref 0.57–1)
EGFRCR SERPLBLD CKD-EPI 2021: 83.3 ML/MIN/1.73
FERRITIN SERPL-MCNC: 14.2 NG/ML (ref 13–150)
GLOBULIN UR ELPH-MCNC: 2.9 GM/DL
GLUCOSE SERPL-MCNC: 87 MG/DL (ref 65–99)
IRON 24H UR-MRATE: 30 MCG/DL (ref 37–145)
IRON SATN MFR SERPL: 6 % (ref 20–50)
POTASSIUM SERPL-SCNC: 4 MMOL/L (ref 3.5–5.2)
PROT SERPL-MCNC: 7.2 G/DL (ref 6–8.5)
SODIUM SERPL-SCNC: 140 MMOL/L (ref 136–145)
TIBC SERPL-MCNC: 490 MCG/DL (ref 298–536)
TRANSFERRIN SERPL-MCNC: 329 MG/DL (ref 200–360)

## 2023-09-29 ENCOUNTER — TELEPHONE (OUTPATIENT)
Dept: INTERNAL MEDICINE | Facility: CLINIC | Age: 23
End: 2023-09-29
Payer: COMMERCIAL

## 2023-09-29 NOTE — TELEPHONE ENCOUNTER
Your labs show continued anemia but this is improved from your last labs.  I recommend you take OTC iron supplement daily   Written by KANWAL Arevalo on 9/26/2023  7:44 AM EDT    Pt notified and verbalized good understanding.

## 2025-04-18 ENCOUNTER — TELEPHONE (OUTPATIENT)
Dept: INTERNAL MEDICINE | Facility: CLINIC | Age: 25
End: 2025-04-18

## 2025-04-18 NOTE — TELEPHONE ENCOUNTER
Patient was last seen in 9/2023.  She will need an appointment to be evaluated.  I advised she go to the ER and get checked out.  Left message on voicemail for patient.

## 2025-04-18 NOTE — TELEPHONE ENCOUNTER
Caller: Nallely Jean    Relationship: Self    Best call back number: [phone]     What medication are you requesting: BIRTH CONTROL OR A MEDICATION TO HELP SLOW THE PERIOD FLOW    What are your current symptoms: EXTREMELY HEAVY PERIOD LASTING 2 PLUS WEEKS NOW    How long have you been experiencing symptoms: YEARS    Have you had these symptoms before:    [x] Yes  [] No    Have you been treated for these symptoms before:   [] Yes  [x] No    If a prescription is needed, what is your preferred pharmacy and phone number: [address] [phone] [facility] [phone]      Additional notes: PATIENT CANNOT GET IN WITH A GYNECOLOGIST UNTIL JULY OR AUGUST. SHE IS DESPERATE FOR TREATMENT AND WOULD LIKE TO SEE IF DR. DEXTER WILL CALL IN EITHER BIRTH CONTROL FOR HER OR A MEDICATION TO HELP SLOW HER PERIOD FLOW. SHE STATES SHE HAS BEEN GETTING LIGHTHEADED AND FEELING LIKE SHE'S GOING TO PASS OUT FROM THE LACK OF BLOOD. SHE IS FILLING A [employer] AND THE LARGEST PAD AVAILABLE WITHIN ABOUT AN HOUR.

## 2025-04-21 ENCOUNTER — TELEMEDICINE (OUTPATIENT)
Dept: INTERNAL MEDICINE | Facility: CLINIC | Age: 25
End: 2025-04-21
Payer: COMMERCIAL

## 2025-04-21 DIAGNOSIS — D50.0 IRON DEFICIENCY ANEMIA DUE TO CHRONIC BLOOD LOSS: ICD-10-CM

## 2025-04-21 DIAGNOSIS — N92.1 MENORRHAGIA WITH IRREGULAR CYCLE: Primary | ICD-10-CM

## 2025-04-21 PROCEDURE — 99214 OFFICE O/P EST MOD 30 MIN: CPT | Performed by: NURSE PRACTITIONER

## 2025-04-21 RX ORDER — NORGESTIMATE AND ETHINYL ESTRADIOL 0.25-0.035
1 KIT ORAL DAILY
Qty: 28 TABLET | Refills: 2 | Status: CANCELLED | OUTPATIENT
Start: 2025-04-21

## 2025-04-21 NOTE — TELEPHONE ENCOUNTER
Patient did not go anywhere and get checked out over the weekend.  I moved her appointment up to today for a video visit with Cristal.

## 2025-04-21 NOTE — PROGRESS NOTES
hcg  This was an audio and video enabled visit.   This provider is located at [address], [place] using a secure Nimbulat Video Visit through Epic. Nallely  is being seen remotely via telehealth  in [place]. Pt provided a secure environment for this session.  Nallely may be asked to present for in office testing and/or evaluation if felt to be unsafe for telemedicine.    The provider identified herself /credentials as appropriate.   By proceeding with the telehealth visit, the patient consents to be seen remotely which allows for patient identifiable information to be sent to a third party as needed. The patient may refuse to be seen remotely at any time. The electronic data is encrypted and password protected, and the patient is advised of the potential risks to privacy not withstanding such measures.    You have chosen to receive care through a telehealth visit. Do you consent to use a video/audio connection for your medical care today? Yes      Subjective   Nallely Jean is a 24 y.o. female.     Chief Complaint   Patient presents with    Menorrhagia       History of Present Illness     History of Present Illness  The patient is a 24-year-old female who is being seen by video visit today with complaints of menorrhagia. She has had menorrhagia for quite some time dating back to at least 2023. Iron deficiency anemia secondary to this blood loss has been noted. She was supposed to be seeing gynecology for this issue.    Menstrual issues have recurred, characterized by prolonged periods of up to 1.5 weeks, during which significant fatigue and exhaustion are experienced. The menstrual cycle, previously regular and lasting 3 to 4 days, has become irregular in recent months. Earlier this month, intermittent spotting for 1.5 weeks was followed by a heavy menstrual flow with numerous blood clots, a symptom not previously encountered. Despite attempts to schedule an appointment with an OB/GYN, securing one  has been challenging due to long waiting times. The current menstrual period, which started approximately 2 weeks ago, is ongoing and varies in intensity throughout the day. The previous period occurred in early 02/2025. A recent episode of dizziness and near syncope while doing laundry necessitated a break and assistance from her sister. Visual disturbances, described as distorted vision, were also reported. A diagnosis of PCOS has been made, and surgical removal of an ovarian cyst has been performed. No uterine fibroids or associated pain are reported. She has been off Sprintec contraception due to regular periods and a busy schedule but notes that symptoms were better managed when on this medication. She is not sexually active and has never been. Recently, her sister's birth control pills were started, which have helped to slow down the bleeding.    A history of iron deficiency anemia secondary to menorrhagia is present. Dietary intake of spinach has been increased to boost iron levels. Previous hospital visits for this issue have resulted in blood transfusions and fluid replacement due to dehydration, but no specific treatment for heavy periods has been provided.    GYNECOLOGICAL HISTORY:  - Last Menstrual Period: 04/2025  - Frequency and Flow: Irregular, heavy with blood clots    PAST SURGICAL HISTORY:  - Ovarian cyst removal  - Dermoid cyst removal          The following portions of the patient's history were reviewed and updated as appropriate: allergies, current medications, past family history, past medical history, past social history, past surgical history and problem list.        Review of Systems   Constitutional:  Positive for fatigue. Negative for fever and unexpected weight loss.   Eyes:  Negative for blurred vision, double vision and visual disturbance.   Respiratory:  Negative for cough, shortness of breath and wheezing.    Cardiovascular:  Negative for chest pain, palpitations and leg swelling.    Gastrointestinal:  Negative for abdominal pain, constipation, diarrhea, nausea and vomiting.   Genitourinary:  Positive for menstrual problem and vaginal bleeding. Negative for difficulty urinating, frequency, urgency, vaginal discharge and vaginal pain.   Musculoskeletal:  Negative for arthralgias and myalgias.   Skin:  Negative for color change and rash.   Neurological:  Positive for light-headedness. Negative for dizziness, weakness and headache.   Hematological:  Negative for adenopathy. Does not bruise/bleed easily.           No outpatient medications have been marked as taking for the 4/21/25 encounter (Telemedicine) with Cristal Zafar APRN.     No Known Allergies        Objective     Physical Exam   Constitutional: She is oriented to person, place, and time. She appears well-developed and well-nourished. No distress.   HENT:   Head: Normocephalic and atraumatic.   Right Ear: External ear normal.   Left Ear: External ear normal.   Mouth/Throat: Oropharynx is clear and moist.   Eyes: Right eye exhibits no discharge. Left eye exhibits no discharge. No scleral icterus.   Neck: Neck normal appearance.  Pulmonary/Chest: Effort normal. No accessory muscle usage.  No respiratory distress.No use of oxygen by nasal cannula  Abdominal: Abdomen appears normal.   Neurological: She is alert and oriented to person, place, and time.   Skin: Skin is dry. She is not diaphoretic. No erythema. No pallor.   Psychiatric: She has a normal mood and affect. Her speech is normal and behavior is normal. Judgment normal. She is attentive.         There were no vitals filed for this visit.  There is no height or weight on file to calculate BMI.        Assessment & Plan     Diagnoses and all orders for this visit:    1. Menorrhagia with irregular cycle (Primary)  -     CBC (No Diff); Future  -     Iron Profile; Future  -     Ferritin; Future  -     Comprehensive Metabolic Panel; Future  -     TSH Rfx On Abnormal To Free T4;  Future  -     hCG, Quantitative, Pregnancy; Future    2. Iron deficiency anemia due to chronic blood loss  -     CBC (No Diff); Future  -     Iron Profile; Future  -     Ferritin; Future  -     Comprehensive Metabolic Panel; Future  -     TSH Rfx On Abnormal To Free T4; Future        Assessment & Plan  1. Menorrhagia.  - Reports heavy menstrual bleeding for the past 2 weeks, with significant blood clots and associated symptoms of dizziness and fatigue.  - History of PCOS and iron deficiency anemia secondary to menorrhagia.  - Comprehensive blood workup will be ordered to assess anemia and determine the need for potential hospitalization, hematology consultation, or iron supplementation. A pregnancy test will also be conducted as per protocol.  - Advised to visit the lab tomorrow for these tests. Upon receipt of the test results, a prescription for Sprintec will be provided to manage symptoms until an appointment with a gynecologist can be secured.    2. Iron deficiency anemia.  - History of iron deficiency anemia secondary to menorrhagia.  - Reports feeling dizzy and fatigued, with a recent episode of near syncope.  - Comprehensive blood workup will include tests to assess the severity of anemia.  - Depending on the results, treatment options may include iron pills, hematology consultation, or hospitalization for blood or iron transfusion.           Return in about 4 weeks (around 5/19/2025) for In Office.    I have reviewed the limitations of a telehealth visit (such as lack of a physical exam and unable to obtain vital signs) and advised the patient that they may need to follow up for an office visit should their symptoms or concerns persist, worsen, or change.  Patient was encouraged to keep me informed of any acute changes, lack of improvement, or any new concerning symptoms.   I discussed my findings,recommendations, and plan of care was with the patient. They verbalized understanding and agreement.    Patient  or patient representative verbalized consent for the use of Ambient Listening during the visit with  KANWAL Thomas for chart documentation. 4/22/2025  08:50 EDT

## 2025-04-22 ENCOUNTER — TELEPHONE (OUTPATIENT)
Dept: INTERNAL MEDICINE | Facility: CLINIC | Age: 25
End: 2025-04-22

## 2025-04-22 ENCOUNTER — HOSPITAL ENCOUNTER (OUTPATIENT)
Facility: HOSPITAL | Age: 25
Discharge: HOME | End: 2025-04-22
Attending: EMERGENCY MEDICINE | Admitting: OBSTETRICS & GYNECOLOGY
Payer: MEDICAID

## 2025-04-22 ENCOUNTER — APPOINTMENT (OUTPATIENT)
Dept: ULTRASOUND IMAGING | Facility: HOSPITAL | Age: 25
End: 2025-04-22
Payer: MEDICAID

## 2025-04-22 ENCOUNTER — READMISSION MANAGEMENT (OUTPATIENT)
Dept: CALL CENTER | Facility: HOSPITAL | Age: 25
End: 2025-04-22
Payer: MEDICAID

## 2025-04-22 VITALS
SYSTOLIC BLOOD PRESSURE: 128 MMHG | BODY MASS INDEX: 35.55 KG/M2 | WEIGHT: 240 LBS | HEIGHT: 69 IN | RESPIRATION RATE: 16 BRPM | HEART RATE: 90 BPM | OXYGEN SATURATION: 98 % | TEMPERATURE: 98 F | DIASTOLIC BLOOD PRESSURE: 83 MMHG

## 2025-04-22 DIAGNOSIS — D64.9 SYMPTOMATIC ANEMIA: Primary | ICD-10-CM

## 2025-04-22 DIAGNOSIS — R55 POSTURAL DIZZINESS WITH NEAR SYNCOPE: ICD-10-CM

## 2025-04-22 DIAGNOSIS — D50.9 IRON DEFICIENCY ANEMIA, UNSPECIFIED IRON DEFICIENCY ANEMIA TYPE: ICD-10-CM

## 2025-04-22 DIAGNOSIS — R53.81 MALAISE AND FATIGUE: ICD-10-CM

## 2025-04-22 DIAGNOSIS — Z87.09 HISTORY OF ASTHMA: ICD-10-CM

## 2025-04-22 DIAGNOSIS — R42 POSTURAL DIZZINESS WITH NEAR SYNCOPE: ICD-10-CM

## 2025-04-22 DIAGNOSIS — R53.83 MALAISE AND FATIGUE: ICD-10-CM

## 2025-04-22 DIAGNOSIS — N92.1 MENORRHAGIA WITH IRREGULAR CYCLE: ICD-10-CM

## 2025-04-22 DIAGNOSIS — Z87.42 HISTORY OF PCOS: ICD-10-CM

## 2025-04-22 DIAGNOSIS — Z86.39 HISTORY OF OBESITY: ICD-10-CM

## 2025-04-22 DIAGNOSIS — R06.09 EXERTIONAL DYSPNEA: ICD-10-CM

## 2025-04-22 LAB
ABO GROUP BLD: NORMAL
ABO GROUP BLD: NORMAL
ALBUMIN SERPL-MCNC: 4.2 G/DL (ref 3.5–5.2)
ALBUMIN/GLOB SERPL: 1.4 G/DL
ALP SERPL-CCNC: 113 U/L (ref 39–117)
ALT SERPL W P-5'-P-CCNC: 17 U/L (ref 1–33)
ANION GAP SERPL CALCULATED.3IONS-SCNC: 12 MMOL/L (ref 5–15)
AST SERPL-CCNC: 15 U/L (ref 1–32)
BACTERIA UR QL AUTO: ABNORMAL /HPF
BASOPHILS # BLD AUTO: 0.03 10*3/MM3 (ref 0–0.2)
BASOPHILS NFR BLD AUTO: 0.3 % (ref 0–1.5)
BILIRUB SERPL-MCNC: 0.2 MG/DL (ref 0–1.2)
BILIRUB UR QL STRIP: NEGATIVE
BLD GP AB SCN SERPL QL: NEGATIVE
BUN SERPL-MCNC: 7 MG/DL (ref 6–20)
BUN/CREAT SERPL: 6 (ref 7–25)
CALCIUM SPEC-SCNC: 8.9 MG/DL (ref 8.6–10.5)
CHLORIDE SERPL-SCNC: 106 MMOL/L (ref 98–107)
CLARITY UR: ABNORMAL
CO2 SERPL-SCNC: 24 MMOL/L (ref 22–29)
COLOR UR: YELLOW
CREAT SERPL-MCNC: 1.16 MG/DL (ref 0.57–1)
DEPRECATED RDW RBC AUTO: 49.2 FL (ref 37–54)
EGFRCR SERPLBLD CKD-EPI 2021: 66.8 ML/MIN/1.73
EOSINOPHIL # BLD AUTO: 0.03 10*3/MM3 (ref 0–0.4)
EOSINOPHIL NFR BLD AUTO: 0.3 % (ref 0.3–6.2)
ERYTHROCYTE [DISTWIDTH] IN BLOOD BY AUTOMATED COUNT: 17.3 % (ref 12.3–15.4)
FERRITIN SERPL-MCNC: 18.51 NG/ML (ref 13–150)
FOLATE SERPL-MCNC: 6.31 NG/ML (ref 4.78–24.2)
GLOBULIN UR ELPH-MCNC: 3 GM/DL
GLUCOSE SERPL-MCNC: 128 MG/DL (ref 65–99)
GLUCOSE UR STRIP-MCNC: NEGATIVE MG/DL
HCG INTACT+B SERPL-ACNC: <0.1 MIU/ML
HCT VFR BLD AUTO: 23.5 % (ref 34–46.6)
HGB BLD-MCNC: 6.9 G/DL (ref 12–15.9)
HGB UR QL STRIP.AUTO: ABNORMAL
HOLD SPECIMEN: NORMAL
HYALINE CASTS UR QL AUTO: ABNORMAL /LPF
IMM GRANULOCYTES # BLD AUTO: 0.07 10*3/MM3 (ref 0–0.05)
IMM GRANULOCYTES NFR BLD AUTO: 0.6 % (ref 0–0.5)
IRON 24H UR-MRATE: 15 MCG/DL (ref 37–145)
IRON SATN MFR SERPL: 3 % (ref 20–50)
KETONES UR QL STRIP: ABNORMAL
LEUKOCYTE ESTERASE UR QL STRIP.AUTO: ABNORMAL
LYMPHOCYTES # BLD AUTO: 2.4 10*3/MM3 (ref 0.7–3.1)
LYMPHOCYTES NFR BLD AUTO: 22.1 % (ref 19.6–45.3)
MCH RBC QN AUTO: 23.3 PG (ref 26.6–33)
MCHC RBC AUTO-ENTMCNC: 29.4 G/DL (ref 31.5–35.7)
MCV RBC AUTO: 79.4 FL (ref 79–97)
MONOCYTES # BLD AUTO: 0.46 10*3/MM3 (ref 0.1–0.9)
MONOCYTES NFR BLD AUTO: 4.2 % (ref 5–12)
MUCOUS THREADS URNS QL MICRO: ABNORMAL /HPF
NEUTROPHILS NFR BLD AUTO: 7.87 10*3/MM3 (ref 1.7–7)
NEUTROPHILS NFR BLD AUTO: 72.5 % (ref 42.7–76)
NITRITE UR QL STRIP: NEGATIVE
NRBC BLD AUTO-RTO: 0.3 /100 WBC (ref 0–0.2)
PH UR STRIP.AUTO: 5.5 [PH] (ref 5–8)
PLATELET # BLD AUTO: 311 10*3/MM3 (ref 140–450)
PMV BLD AUTO: 9.3 FL (ref 6–12)
POTASSIUM SERPL-SCNC: 3.7 MMOL/L (ref 3.5–5.2)
PROT SERPL-MCNC: 7.2 G/DL (ref 6–8.5)
PROT UR QL STRIP: ABNORMAL
RBC # BLD AUTO: 2.96 10*6/MM3 (ref 3.77–5.28)
RBC # UR STRIP: ABNORMAL /HPF
REF LAB TEST METHOD: ABNORMAL
RETICS # AUTO: 0.08 10*6/MM3 (ref 0.02–0.13)
RETICS/RBC NFR AUTO: 2.57 % (ref 0.7–1.9)
RH BLD: POSITIVE
RH BLD: POSITIVE
SODIUM SERPL-SCNC: 142 MMOL/L (ref 136–145)
SP GR UR STRIP: 1.03 (ref 1–1.03)
SQUAMOUS #/AREA URNS HPF: ABNORMAL /HPF
T&S EXPIRATION DATE: NORMAL
TIBC SERPL-MCNC: 457 MCG/DL (ref 298–536)
TRANSFERRIN SERPL-MCNC: 307 MG/DL (ref 200–360)
UROBILINOGEN UR QL STRIP: ABNORMAL
VIT B12 BLD-MCNC: 188 PG/ML (ref 211–946)
WBC # UR STRIP: ABNORMAL /HPF
WBC NRBC COR # BLD AUTO: 10.86 10*3/MM3 (ref 3.4–10.8)
WHOLE BLOOD HOLD COAG: NORMAL
WHOLE BLOOD HOLD SPECIMEN: NORMAL

## 2025-04-22 PROCEDURE — 82728 ASSAY OF FERRITIN: CPT | Performed by: PHYSICIAN ASSISTANT

## 2025-04-22 PROCEDURE — 76856 US EXAM PELVIC COMPLETE: CPT

## 2025-04-22 PROCEDURE — 86901 BLOOD TYPING SEROLOGIC RH(D): CPT

## 2025-04-22 PROCEDURE — 84466 ASSAY OF TRANSFERRIN: CPT | Performed by: PHYSICIAN ASSISTANT

## 2025-04-22 PROCEDURE — 86901 BLOOD TYPING SEROLOGIC RH(D): CPT | Performed by: PHYSICIAN ASSISTANT

## 2025-04-22 PROCEDURE — G0378 HOSPITAL OBSERVATION PER HR: HCPCS

## 2025-04-22 PROCEDURE — 86900 BLOOD TYPING SEROLOGIC ABO: CPT | Performed by: PHYSICIAN ASSISTANT

## 2025-04-22 PROCEDURE — 86850 RBC ANTIBODY SCREEN: CPT | Performed by: PHYSICIAN ASSISTANT

## 2025-04-22 PROCEDURE — 36415 COLL VENOUS BLD VENIPUNCTURE: CPT

## 2025-04-22 PROCEDURE — 25810000003 SODIUM CHLORIDE 0.9 % SOLUTION: Performed by: PHYSICIAN ASSISTANT

## 2025-04-22 PROCEDURE — 81001 URINALYSIS AUTO W/SCOPE: CPT | Performed by: EMERGENCY MEDICINE

## 2025-04-22 PROCEDURE — 99285 EMERGENCY DEPT VISIT HI MDM: CPT

## 2025-04-22 PROCEDURE — 86900 BLOOD TYPING SEROLOGIC ABO: CPT

## 2025-04-22 PROCEDURE — 36430 TRANSFUSION BLD/BLD COMPNT: CPT

## 2025-04-22 PROCEDURE — 85045 AUTOMATED RETICULOCYTE COUNT: CPT | Performed by: PHYSICIAN ASSISTANT

## 2025-04-22 PROCEDURE — 82607 VITAMIN B-12: CPT | Performed by: PHYSICIAN ASSISTANT

## 2025-04-22 PROCEDURE — 85025 COMPLETE CBC W/AUTO DIFF WBC: CPT | Performed by: EMERGENCY MEDICINE

## 2025-04-22 PROCEDURE — 84702 CHORIONIC GONADOTROPIN TEST: CPT | Performed by: PHYSICIAN ASSISTANT

## 2025-04-22 PROCEDURE — 83540 ASSAY OF IRON: CPT | Performed by: PHYSICIAN ASSISTANT

## 2025-04-22 PROCEDURE — P9016 RBC LEUKOCYTES REDUCED: HCPCS

## 2025-04-22 PROCEDURE — 83036 HEMOGLOBIN GLYCOSYLATED A1C: CPT | Performed by: OBSTETRICS & GYNECOLOGY

## 2025-04-22 PROCEDURE — 82746 ASSAY OF FOLIC ACID SERUM: CPT | Performed by: PHYSICIAN ASSISTANT

## 2025-04-22 PROCEDURE — 80053 COMPREHEN METABOLIC PANEL: CPT | Performed by: EMERGENCY MEDICINE

## 2025-04-22 PROCEDURE — 86923 COMPATIBILITY TEST ELECTRIC: CPT

## 2025-04-22 PROCEDURE — 93005 ELECTROCARDIOGRAM TRACING: CPT | Performed by: PHYSICIAN ASSISTANT

## 2025-04-22 RX ORDER — NORGESTIMATE AND ETHINYL ESTRADIOL 0.25-0.035
KIT ORAL
Qty: 84 TABLET | Refills: 1 | Status: SHIPPED | OUTPATIENT
Start: 2025-04-22

## 2025-04-22 RX ORDER — SODIUM CHLORIDE 0.9 % (FLUSH) 0.9 %
10 SYRINGE (ML) INJECTION AS NEEDED
Status: DISCONTINUED | OUTPATIENT
Start: 2025-04-22 | End: 2025-04-22 | Stop reason: HOSPADM

## 2025-04-22 RX ORDER — NORETHINDRONE ACETATE AND ETHINYL ESTRADIOL 1; 5 MG/1; UG/1
1 TABLET ORAL DAILY
Status: DISCONTINUED | OUTPATIENT
Start: 2025-04-22 | End: 2025-04-22 | Stop reason: HOSPADM

## 2025-04-22 RX ADMIN — NORETHINDRONE ACETATE AND ETHINYL ESTRADIOL 1 TABLET: 1; 5 TABLET ORAL at 17:06

## 2025-04-22 RX ADMIN — SODIUM CHLORIDE 1000 ML: 9 INJECTION, SOLUTION INTRAVENOUS at 02:23

## 2025-04-22 NOTE — ED PROVIDER NOTES
Subjective   History of Present Illness  This is a 24-year-old female that presents to the ER with heavy irregular vaginal bleeding for the last 8 days..  Patient has history of irregular menses due to PCOS.  She says that in January, she had normal menses for 7 days.  She did not have menses in February or March, 2025.  She had light vaginal spotting the first week of April and has had heavy vaginal bleeding with passing large clots for the last 8 days.  Patient denies taking daily aspirin, frequent NSAIDs, or other chronic anticoagulation.  She has been following with [facility] through [facility].  Patient says that she has history of iron deficiency anemia from blood loss from irregular menses in the past which has required blood transfusions x 2.  Patient denies any suprapubic abdominal cramping.  She does report increased malaise/fatigue, postural dizziness with near syncope, and exertional dyspnea.  Patient denies being sexually active.  She says that she is a virgin.  Patient has not tried any over-the-counter iron supplements, although she has taken those in the past.  She does not utilize any oral hormone replacement either.  She says in the past she has had OCPs, and recently with increased vaginal bleeding, she took her sister's birth control of Sprintec and has taken that daily for the last 3 days.  Patient says that this is the OCP pack that patient took in the past, so she felt comfortable taking this.  It really did not help improve her symptoms.  She said that she has been using 2 large absorbency pads per day for the last 8 days.    History provided by:  Patient  Vaginal Bleeding  Quality:  Bright red and clots  Severity:  Moderate  Duration:  8 days  Menstrual history:  Irregular  Number of pads used:  2 large absorbancy pads.  Possible pregnancy: no    Context: spontaneously    Context comment:  History of irregular menses secondary to PCOS.  Patient reports heavy vaginal  bleeding for close to 3 weeks.  Patient reports symptomatic anemia with postural dizziness, exertional dyspnea, and malaise/fatigue.  Associated symptoms: dizziness (postural dizziness with near syncope.) and fatigue    Associated symptoms: no abdominal pain, no back pain, no dysuria and no nausea    Risk factors: no bleeding disorder    Risk factors comment:  History of PCOS with dysfunctional vaginal bleeding. Previous blood transfusions in the past x 2. Pt follows with [facility], at .      Review of Systems   Constitutional:  Positive for activity change, appetite change and fatigue.   Respiratory:  Positive for shortness of breath (exertional dyspnea).    Cardiovascular: Negative.  Negative for palpitations.   Gastrointestinal: Negative.  Negative for abdominal pain, constipation, diarrhea, nausea and vomiting.   Endocrine: Positive for polydipsia.   Genitourinary:  Positive for vaginal bleeding. Negative for dysuria, flank pain and urgency.        Heavy vaginal bleeding x 8 days with large clots. History of PCOS. No oral hormone replacement. Menses in 1/2025 lasting 7 days. No menses in 2/25 and 3/25. Light spotting the first week of April and now heavy, vaginal bleeding with large clots x 8 days.  Patient denies being sexually active.  She says that she is a virgin.   Musculoskeletal: Negative.  Negative for back pain.   Skin: Negative.  Negative for color change and pallor.   Neurological:  Positive for dizziness (postural dizziness with near syncope.) and weakness. Negative for syncope.   All other systems reviewed and are negative.      Past Medical History:   Diagnosis Date    Asthma     as a child    History of blood transfusion     after heavy menses    Menorrhagia     in high school    Migraines        No Known Allergies    Past Surgical History:   Procedure Laterality Date    ADENOIDECTOMY      DENTAL PROCEDURE      [place] teeth    EAR TUBES      TONSILLECTOMY         Family History    Problem Relation Age of Onset    Hypertension Mother     Obesity Mother     Migraines Mother     Lung cancer Maternal Grandmother     Hypertension Maternal Grandmother     Cancer Maternal Great-Grandmother     Diabetes Maternal Great-Grandmother     Hypertension Maternal Great-Grandmother     Migraines Maternal Great-Grandmother     Heart attack Maternal Grandfather        Social History     Socioeconomic History    Marital status: Single    Number of children: 0    Highest education level: Some college, no degree   Tobacco Use    Smoking status: Never    Smokeless tobacco: Never   Substance and Sexual Activity    Alcohol use: No    Drug use: No    Sexual activity: Never     Birth control/protection: None           Objective   Physical Exam  Vitals and nursing note reviewed.   Constitutional:       General: She is not in acute distress.     Appearance: Normal appearance. She is obese. She is not ill-appearing, toxic-appearing or diaphoretic.      Comments: Nontoxic.  No acute sign of pain or distress.  Obesity with BMI of 35.   HENT:      Head: Normocephalic and atraumatic.      Nose: Nose normal.      Mouth/Throat:      Mouth: Mucous membranes are dry.      Comments: Oral mucous membranes are slightly dry  Eyes:      Extraocular Movements: Extraocular movements intact.      Conjunctiva/sclera: Conjunctivae normal.      Pupils: Pupils are equal, round, and reactive to light.   Cardiovascular:      Rate and Rhythm: Regular rhythm. Tachycardia present. No extrasystoles are present.     Pulses: Normal pulses.      Heart sounds: Normal heart sounds.      Comments: Mild tachycardia.  No ectopy.  No pedal edema to lower extremities  Pulmonary:      Effort: Pulmonary effort is normal. No tachypnea, accessory muscle usage or retractions.      Breath sounds: Normal breath sounds. No decreased breath sounds, wheezing or rhonchi.      Comments: No tachypnea, accessory muscle use, or retractions.  Good air exchange of  bilateral lung fields.  Abdominal:      General: Bowel sounds are normal. There is no distension.      Palpations: Abdomen is soft.      Tenderness: There is no abdominal tenderness. There is no right CVA tenderness, left CVA tenderness, guarding or rebound.      Comments: Abdomen soft without distention.  Active bowel sounds in all 4 quadrants.  No particular abdominal tenderness.  No flank or CVA tenderness.  Abdominal exam is benign and nonsurgical.   Genitourinary:     Vagina: Bleeding present.      Comments: Minimal blood noted in the vaginal vault. No clots.  Musculoskeletal:         General: Normal range of motion.      Cervical back: Normal range of motion and neck supple.      Right lower leg: No edema.      Left lower leg: No edema.   Skin:     General: Skin is warm and dry.      Findings: No bruising or ecchymosis.      Comments: Patient is -American and I cannot appreciate any pallor.  No evidence of increased bruising to the skin.   Neurological:      General: No focal deficit present.      Mental Status: She is alert and oriented to person, place, and time.      Cranial Nerves: Cranial nerves 2-12 are intact.      Sensory: Sensation is intact.      Motor: Motor function is intact. Weakness present.      Coordination: Coordination is intact.      Comments: Alert and oriented x 3.  Generally weak with malaise/fatigue.  No focal neurologic deficits.   Psychiatric:         Mood and Affect: Mood and affect normal.         Speech: Speech normal.         Behavior: Behavior is cooperative.         Thought Content: Thought content normal.         Cognition and Memory: Cognition and memory normal.         Judgment: Judgment normal.      Comments: Normal mood pleasant affect.         Procedures           ED Course  ED Course as of 04/22/25 0540   Tue Apr 22, 2025   0458 Patient has normal blood pressure upon arrival.  She was tachycardic with a rate of 118 but all other vital signs are stable.  Patient has  been going through 2 large absorbency pads per day for the last 8 days.  She is also passing large clots.  She is not on any daily aspirin, frequent NSAIDs, or chronic anticoagulation.  She denies any suprapubic cramping or abdominal discomfort.  CBC reveals white blood cell count of 10.86.  H&H is 6.9 and 23.5.  MCV is 79.  Platelet count is normal at 3 11K.  Chemistries were essentially normal other than creatinine of 1.16.  Last creatinine 1 year ago in epic was normal at 0.97.  Quant hCG is negative and reticulocyte count is 2.57.  Serum iron is 15 with 3% iron saturation.  Ferritin is 18.51.  Type and screen performed and I ordered 2 units of PRBCs due to symptomatic anemia.  I personally interpreted EKG which showed sinus rhythm.  There was no acute ST-T wave changes consistent with ischemia.  Awaiting pelvic ultrasound results.  Patient is not sexually active and has never had sexual intercourse.  Ultrasound tech performed ultrasound with abdominal placement, not transvaginal.  PRBCs are ready in the lab and nursing staff is going to get them to initiate blood transfusion.  Recommend observational admission for PRBCs and possible hematology consult for iron deficiency anemia. [FC]   0517 Discussed admission with Dr. Ahmadi, hospitalist, and he recommends admission per Gyn due to symptomatic anemia from vaginal bleeding. Radiologist read the pelvic ultrasound as normal. Paged Dr. Conteh, on-call Gyn, to discuss the case. [FC]   0520 Discussed the case with Dr. Conteh.  She said that she would be happy to consult on the case, but requested hospitalist to admit and give blood transfusions and once H&H improves, patient can be discharged and follow-up with her routine GYN through [facility].  She recommended pelvic exam.  I will go perform that and then repaged hospitalist. [FC]   0535 Pelvic exam revealed minimal blood in vaginal vault. I discussed admission to the GYN with Dr. Ahmadi, zac.  He  feels that this is a GYN issue.  I repaged Dr. Conteh.  She is agreeable to admission on her service to .  PRBCs are in process.  I updated patient and mother at the bedside.  Vital signs are stable. [FC]      ED Course User Index  [FC] Bronwyn Zapien, JOSEFINA                                                  Recent Results (from the past 24 hours)   Comprehensive Metabolic Panel    Collection Time: 04/22/25  1:36 AM    Specimen: Blood   Result Value Ref Range    Glucose 128 (H) 65 - 99 mg/dL    BUN 7 6 - 20 mg/dL    Creatinine 1.16 (H) 0.57 - 1.00 mg/dL    Sodium 142 136 - 145 mmol/L    Potassium 3.7 3.5 - 5.2 mmol/L    Chloride 106 98 - 107 mmol/L    CO2 24.0 22.0 - 29.0 mmol/L    Calcium 8.9 8.6 - 10.5 mg/dL    Total Protein 7.2 6.0 - 8.5 g/dL    Albumin 4.2 3.5 - 5.2 g/dL    ALT (SGPT) 17 1 - 33 U/L    AST (SGOT) 15 1 - 32 U/L    Alkaline Phosphatase 113 39 - 117 U/L    Total Bilirubin 0.2 0.0 - 1.2 mg/dL    Globulin 3.0 gm/dL    A/G Ratio 1.4 g/dL    BUN/Creatinine Ratio 6.0 (L) 7.0 - 25.0    Anion Gap 12.0 5.0 - 15.0 mmol/L    eGFR 66.8 >60.0 mL/min/1.73   Green Top (Gel)    Collection Time: 04/22/25  1:36 AM   Result Value Ref Range    Extra Tube Hold for add-ons.    Lavender Top    Collection Time: 04/22/25  1:36 AM   Result Value Ref Range    Extra Tube hold for add-on    Gold Top - SST    Collection Time: 04/22/25  1:36 AM   Result Value Ref Range    Extra Tube Hold for add-ons.    Gray Top    Collection Time: 04/22/25  1:36 AM   Result Value Ref Range    Extra Tube Hold for add-ons.    Light Blue Top    Collection Time: 04/22/25  1:36 AM   Result Value Ref Range    Extra Tube Hold for add-ons.    CBC Auto Differential    Collection Time: 04/22/25  1:36 AM    Specimen: Blood   Result Value Ref Range    WBC 10.86 (H) 3.40 - 10.80 10*3/mm3    RBC 2.96 (L) 3.77 - 5.28 10*6/mm3    Hemoglobin 6.9 (C) 12.0 - 15.9 g/dL    Hematocrit 23.5 (L) 34.0 - 46.6 %    MCV 79.4 79.0 - 97.0 fL    MCH 23.3 (L) 26.6 - 33.0 pg     MCHC 29.4 (L) 31.5 - 35.7 g/dL    RDW 17.3 (H) 12.3 - 15.4 %    RDW-SD 49.2 37.0 - 54.0 fl    MPV 9.3 6.0 - 12.0 fL    Platelets 311 140 - 450 10*3/mm3    Neutrophil % 72.5 42.7 - 76.0 %    Lymphocyte % 22.1 19.6 - 45.3 %    Monocyte % 4.2 (L) 5.0 - 12.0 %    Eosinophil % 0.3 0.3 - 6.2 %    Basophil % 0.3 0.0 - 1.5 %    Immature Grans % 0.6 (H) 0.0 - 0.5 %    Neutrophils, Absolute 7.87 (H) 1.70 - 7.00 10*3/mm3    Lymphocytes, Absolute 2.40 0.70 - 3.10 10*3/mm3    Monocytes, Absolute 0.46 0.10 - 0.90 10*3/mm3    Eosinophils, Absolute 0.03 0.00 - 0.40 10*3/mm3    Basophils, Absolute 0.03 0.00 - 0.20 10*3/mm3    Immature Grans, Absolute 0.07 (H) 0.00 - 0.05 10*3/mm3    nRBC 0.3 (H) 0.0 - 0.2 /100 WBC   hCG, Quantitative, Pregnancy    Collection Time: 04/22/25  1:36 AM    Specimen: Blood   Result Value Ref Range    HCG Quantitative <0.10 mIU/mL   Reticulocytes    Collection Time: 04/22/25  1:36 AM    Specimen: Blood   Result Value Ref Range    Reticulocyte % 2.57 (H) 0.70 - 1.90 %    Reticulocyte Absolute 0.0768 0.0200 - 0.1300 10*6/mm3   Ferritin    Collection Time: 04/22/25  1:36 AM    Specimen: Blood   Result Value Ref Range    Ferritin 18.51 13.00 - 150.00 ng/mL   Iron Profile    Collection Time: 04/22/25  1:36 AM    Specimen: Blood   Result Value Ref Range    Iron 15 (L) 37 - 145 mcg/dL    Iron Saturation (TSAT) 3 (L) 20 - 50 %    Transferrin 307 200 - 360 mg/dL    TIBC 457 298 - 536 mcg/dL   Urinalysis With Microscopic If Indicated (No Culture) - Urine, Clean Catch    Collection Time: 04/22/25  2:03 AM    Specimen: Urine, Clean Catch   Result Value Ref Range    Color, UA Yellow Yellow, Straw    Appearance, UA Cloudy (A) Clear    pH, UA 5.5 5.0 - 8.0    Specific Gravity, UA 1.027 1.001 - 1.030    Glucose, UA Negative Negative    Ketones, UA Trace (A) Negative    Bilirubin, UA Negative Negative    Blood, UA Large (3+) (A) Negative    Protein, UA 30 mg/dL (1+) (A) Negative    Leuk Esterase, UA Small (1+) (A)  Negative    Nitrite, UA Negative Negative    Urobilinogen, UA 1.0 E.U./dL 0.2 - 1.0 E.U./dL   Urinalysis, Microscopic Only - Urine, Clean Catch    Collection Time: 04/22/25  2:03 AM    Specimen: Urine, Clean Catch   Result Value Ref Range    RBC, UA Too Numerous to Count (A) None Seen, 0-2 /HPF    WBC, UA 11-20 (A) None Seen, 0-2 /HPF    Bacteria, UA Trace None Seen, Trace /HPF    Squamous Epithelial Cells, UA 7-12 (A) None Seen, 0-2 /HPF    Hyaline Casts, UA None Seen 0 - 6 /LPF    Mucus, UA Trace None Seen, Trace /HPF    Methodology Manual Light Microscopy    Type & Screen    Collection Time: 04/22/25  2:47 AM    Specimen: Arm, Left; Blood   Result Value Ref Range    ABO Type A     RH type Positive     Antibody Screen Negative     T&S Expiration Date 4/25/2025 11:59:59 PM    ABO RH Specimen Verification    Collection Time: 04/22/25  3:54 AM    Specimen: Blood   Result Value Ref Range    ABO Type A     RH type Positive    ECG 12 Lead Tachycardia    Collection Time: 04/22/25  4:31 AM   Result Value Ref Range    QT Interval 346 ms    QTC Interval 444 ms   Prepare RBC, 2 Units    Collection Time: 04/22/25  4:58 AM   Result Value Ref Range    Product Code S3420N81     Unit Number [id number]     UNIT  ABO A     UNIT  RH POS     Crossmatch Interpretation Compatible     Dispense Status XM     Blood Expiration Date 202505012359     Blood Type Barcode 6200     Product Code B7045V30     Unit Number [id number]     UNIT  ABO A     UNIT  RH POS     Crossmatch Interpretation Compatible     Dispense Status IS     Blood Expiration Date 202505062359     Blood Type Barcode 6200      Note: In addition to lab results from this visit, the labs listed above may include labs taken at another facility or during a different encounter within the last 24 hours. Please correlate lab times with ED admission and discharge times for further clarification of the services performed during this visit.    US Pelvis Complete   Final Result    Impression:   Normal pelvic ultrasound.                  Electronically Signed: Jonathan Sheikh MD     4/22/2025 5:08 AM EDT     Workstation ID: [id number]        Vitals:    04/22/25 0500 04/22/25 0506 04/22/25 0526 04/22/25 0530   BP: 161/87 173/81 173/86    BP Location:  Left arm     Patient Position:  Sitting     Pulse: 94 92 98 98   Resp:  16 18    Temp:  98.1 °F (36.7 °C) 98.3 °F (36.8 °C)    TempSrc:       SpO2: 99% 99% 97% 100%   Weight:       Height:         Medications   sodium chloride 0.9 % flush 10 mL (has no administration in time range)   sodium chloride 0.9 % flush 10 mL (has no administration in time range)   sodium chloride 0.9 % bolus 1,000 mL (0 mL Intravenous Stopped 4/22/25 0324)     ECG/EMG Results (last 24 hours)       ** No results found for the last 24 hours. **          ECG 12 Lead Tachycardia   Preliminary Result   Test Reason : Tachycardia   Blood Pressure :   */*   mmHG   Vent. Rate :  99 BPM     Atrial Rate :  99 BPM      P-R Int : 124 ms          QRS Dur :  84 ms       QT Int : 346 ms       P-R-T Axes :  60  17 -13 degrees     QTcB Int : 444 ms      Normal sinus rhythm   Minimal voltage criteria for LVH, may be normal variant   Nonspecific T wave abnormality   Abnormal ECG   When compared with ECG of 12-Feb-2018 19:09,   Nonspecific T wave abnormality now evident in Anterior leads   Nonspecific T wave abnormality, improved in Lateral leads      Referred By: BLOEMER           Confirmed By:       ECG 12 Lead ED Triage Standing Order; Weak / Dizzy / AMS    (Results Pending)              Medical Decision Making  Amount and/or Complexity of Data Reviewed  Labs: ordered.  Radiology: ordered.  ECG/medicine tests: ordered.    Risk  Prescription drug management.        Final diagnoses:   Symptomatic anemia   Menorrhagia with irregular cycle   Exertional dyspnea   Postural dizziness with near syncope   Iron deficiency anemia, unspecified iron deficiency anemia type   Malaise and fatigue   History  of PCOS   History of obesity   History of asthma       ED Disposition  ED Disposition       ED Disposition   Decision to Admit    Condition   --    Comment   Level of Care: Med/Surg [1]   Admitting Physician: ORAL LEOS [[id number]]   Attending Physician: ORAL LEOS [[id number]]   Bed Request Comments: Please place patient on 5B                 No follow-up provider specified.       Medication List      No changes were made to your prescriptions during this visit.            Bronwyn Zapien, JOSEFINA  04/22/25 0540

## 2025-04-22 NOTE — DISCHARGE SUMMARY
Date of Discharge:  4/22/2025    Discharge Diagnosis: Symptomatic anemia, PCOS, Obesity, DUB    Presenting Problem/History of Present Illness  Anemia [D64.9]       Hospital Course  Patient is a 24 y.o. female presented with symptomatic anemia, DUB.  Admitted for transfusion.  After 2 units, feeling much better, tachycardia resolved.  D/c'd on Sprintec 2/day x 3 days and then daily thereafter.        Consults:   Consults       No orders found from 3/24/2025 to 4/23/2025.            Pertinent Test Results:   Lab Results   Component Value Date    WBC 10.86 (H) 04/22/2025    HGB 6.9 (C) 04/22/2025    HCT 23.5 (L) 04/22/2025    MCV 79.4 04/22/2025     04/22/2025    ABORH A Positive 07/01/2022     Results from last 7 days   Lab Units 04/22/25  0136   AST (SGOT) U/L 15     Results from last 7 days   Lab Units 04/22/25  0136   ALT (SGPT) U/L 17      Results from last 7 days   Lab Units 04/22/25  0136   CREATININE mg/dL 1.16*      Results from last 7 days   Lab Units 04/22/25  0136   BILIRUBIN mg/dL 0.2       Condition on Discharge:  stable    Vital Signs  Temp:  [97 °F (36.1 °C)-98.6 °F (37 °C)] 98.2 °F (36.8 °C)  Heart Rate:  [] 93  Resp:  [16-20] 16  BP: (118-173)/(56-99) 130/60        Discharge Disposition  Home or Self Care    Discharge Medications     Discharge Medications        Changes to Medications        Instructions Start Date   Sprintec 28 0.25-35 MG-MCG per tablet  Generic drug: norgestimate-ethinyl estradiol  What changed:   how much to take  how to take this  when to take this  additional instructions   Take two tabs orally for 3 days and then daily thereafter.               Activity at Discharge: no restrictions    Follow-up Appointments  F/u with PCP and/or [facility] NP      Test Results Pending at Discharge  Pending Labs       Order Current Status    Hemoglobin A1C With EMG In process             Naima Conteh MD  04/22/25  15:05 EDT

## 2025-04-22 NOTE — H&P
History and Physical      Patient Care Team:  Cristal Zafar APRN as PCP - General (Nurse Practitioner)  Kaleb Marroquin MD (Psychiatry)  Fozia Velazco MD (Allergy)  Roel Aleman Jr., MD (Otolaryngology)    Chief complaint dizziness, vaginal bleeding    Subjective .     History of present illness:  25yo G0 with h/o PCOS presents with No menses for two months and then a period that won't stop.  C/o fatigue, near syncope, exertional dyspnea.  She normally sees Hui Scott NP at  and when taking OCPs(sprintec), periods are regular and lighter.  But she hasn't seen her since fall 2023.    Pt. Was admitted from ED early this AM for blood transfusion.  Bleeding is light at this point as she has been taking sprintec x 3 days.    Has never had a pap.  No h/o intercourse.  Thinks she has had HPV vaccines.      History  Past Medical History:   Diagnosis Date    Asthma     as a child    History of blood transfusion     after heavy menses    Menorrhagia     in high school    Migraines    ,   Past Surgical History:   Procedure Laterality Date    ADENOIDECTOMY      DENTAL PROCEDURE      [place] teeth    EAR TUBES      TONSILLECTOMY     ,   Medications Prior to Admission   Medication Sig Dispense Refill Last Dose/Taking    Sprintec 28 0.25-35 MG-MCG per tablet Take 1 tablet by mouth Daily. (Patient not taking: Reported on 9/18/2023)      , Allergies:  Patient has no known allergies., OBGYN History G0,   Family History   Problem Relation Age of Onset    Hypertension Mother     Obesity Mother     Migraines Mother     Lung cancer Maternal Grandmother     Hypertension Maternal Grandmother     Cancer Maternal Great-Grandmother     Diabetes Maternal Great-Grandmother     Hypertension Maternal Great-Grandmother     Migraines Maternal Great-Grandmother     Heart attack Maternal Grandfather    , and   Social History     Tobacco Use    Smoking status: Never    Smokeless tobacco: Never   Substance Use Topics     "Alcohol use: No    Drug use: No       Review of Systems  Pertinent items are noted in HPI, all other systems reviewed and negative      Objective     Vital Signs   Blood pressure 128/65, pulse 96, temperature 98.6 °F (37 °C), temperature source Oral, resp. rate 18, height 175.3 cm (69\"), weight 109 kg (240 lb), SpO2 98%, not currently breastfeeding.    Physical Exam:     General Appearance:    Alert, cooperative, in no acute distress lying in bed   Head:    Normocephalic, without obvious abnormality, atraumatic   Lungs:     Breathing unlabored, no obvious shortness of air, cough.    Heart:    Not examined   Abdomen:     Normal bowel sounds, no masses, no organomegaly, soft        non-tender, non-distended, no guarding, no rebound                 tenderness   :    Exam not repeated   Extremities:   Moves all extremities well, no edema, no cyanosis, no              redness   Skin:   No bleeding, bruising or rash   Psych:   Alert and oriented to person, place and time, Normal mood,   normal speech     Labs:                                                                                             Lab Results (last 24 hours)       Procedure Component Value Units Date/Time    Ferritin [[id number]]  (Normal) Collected: 04/22/25 0136    Specimen: Blood Updated: 04/22/25 0313     Ferritin 18.51 ng/mL     Narrative:      Results may be falsely decreased if patient taking Biotin.      Folate [[id number]] Collected: 04/22/25 0136    Specimen: Blood Updated: 04/22/25 0313    Vitamin B12 [[id number]] Collected: 04/22/25 0136    Specimen: Blood Updated: 04/22/25 0313    Iron Profile [[id number]]  (Abnormal) Collected: 04/22/25 0136    Specimen: Blood Updated: 04/22/25 0308     Iron 15 mcg/dL      Iron Saturation (TSAT) 3 %      Transferrin 307 mg/dL      TIBC 457 mcg/dL     hCG, Quantitative, Pregnancy [[id number]] Collected: 04/22/25 0136    Specimen: Blood Updated: 04/22/25 0307     HCG Quantitative <0.10 mIU/mL     Narrative:   "    HCG Ranges by Gestational Age    Females - non-pregnant premenopausal   </= 1mIU/mL HCG  Females - postmenopausal               </= 7mIU/mL HCG    3 Weeks         5.8 -    71.2 mIU/mL  4 Weeks         9.5 -     750 mIU/mL  5 Weeks         217 -   7,138 mIU/mL  6 Weeks         158 -  31,795 mIU/mL  7 Weeks       3,697 - 163,563 mIU/mL  8 Weeks      32,065 - 149,571 mIU/mL  9 Weeks      63,803 - 151,410 mIU/mL  10 Weeks     46,509 - 186,977 mIU/mL  12 Weeks     27,832 - 210,612 mIU/mL  14 Weeks     13,950 -  62,530 mIU/mL  15 Weeks     12,039 -  70,971 mIU/mL  16 Weeks      9,040 -  56,451 mIU/mL  17 Weeks      8,175 -  55,868 mIU/mL  18 Weeks      8,099 -  58,176 mIU/mL    Urinalysis, Microscopic Only - Urine, Clean Catch [[id number]]  (Abnormal) Collected: 04/22/25 0203    Specimen: Urine, Clean Catch Updated: 04/22/25 0246     RBC, UA Too Numerous to Count /HPF      WBC, UA 11-20 /HPF      Bacteria, UA Trace /HPF      Squamous Epithelial Cells, UA 7-12 /HPF      Hyaline Casts, UA None Seen /LPF      Mucus, UA Trace /HPF      Methodology Manual Light Microscopy    Reticulocytes [[id number]]  (Abnormal) Collected: 04/22/25 0136    Specimen: Blood Updated: 04/22/25 0232     Reticulocyte % 2.57 %      Reticulocyte Absolute 0.0768 10*6/mm3     Urinalysis With Microscopic If Indicated (No Culture) - Urine, Clean Catch [[id number]]  (Abnormal) Collected: 04/22/25 0203    Specimen: Urine, Clean Catch Updated: 04/22/25 0228     Color, UA Yellow     Appearance, UA Cloudy     pH, UA 5.5     Specific Gravity, UA 1.027     Glucose, UA Negative     Ketones, UA Trace     Bilirubin, UA Negative     Blood, UA Large (3+)     Protein, UA 30 mg/dL (1+)     Leuk Esterase, UA Small (1+)     Nitrite, UA Negative     Urobilinogen, UA 1.0 E.U./dL    Comprehensive Metabolic Panel [[id number]]  (Abnormal) Collected: 04/22/25 0136    Specimen: Blood Updated: 04/22/25 0210     Glucose 128 mg/dL      BUN 7 mg/dL      Creatinine 1.16 mg/dL       Sodium 142 mmol/L      Potassium 3.7 mmol/L      Chloride 106 mmol/L      CO2 24.0 mmol/L      Calcium 8.9 mg/dL      Total Protein 7.2 g/dL      Albumin 4.2 g/dL      ALT (SGPT) 17 U/L      AST (SGOT) 15 U/L      Alkaline Phosphatase 113 U/L      Total Bilirubin 0.2 mg/dL      Globulin 3.0 gm/dL      Comment: Calculated Result        A/G Ratio 1.4 g/dL      BUN/Creatinine Ratio 6.0     Anion Gap 12.0 mmol/L      eGFR 66.8 mL/min/1.73     Narrative:      GFR Categories in Chronic Kidney Disease (CKD)      GFR Category          GFR (mL/min/1.73)    Interpretation  G1                     90 or greater         Normal or high (1)  G2                      60-89                Mild decrease (1)  G3a                   45-59                Mild to moderate decrease  G3b                   30-44                Moderate to severe decrease  G4                    15-29                Severe decrease  G5                    14 or less           Kidney failure          (1)In the absence of evidence of kidney disease, neither GFR category G1 or G2 fulfill the criteria for CKD.    eGFR calculation 2021 CKD-EPI creatinine equation, which does not include race as a factor    [place] Draw [[id number]] Collected: 04/22/25 0136    Specimen: Blood Updated: 04/22/25 0200    Narrative:      The following orders were created for panel order [place] Draw.  Procedure                               Abnormality         Status                     ---------                               -----------         ------                     Green Top (Gel)[[id number]]                                  Final result               Lavender Top[[id number]]                                     Final result               Gold Top - [facility][[id number]]                                   Final result               Gray Top[[id number]]                                         Final result               Light Blue Top[[id number]]                                   Final result                  Please view results for these tests on the individual orders.    Light Blue Top [[id number]] Collected: 04/22/25 0136    Specimen: Blood Updated: 04/22/25 0200     Extra Tube Hold for add-ons.     Comment: Auto resulted       Green Top (Gel) [[id number]] Collected: 04/22/25 0136    Specimen: Blood Updated: 04/22/25 0200     Extra Tube Hold for add-ons.     Comment: Auto resulted.       Lavender Top [[id number]] Collected: 04/22/25 0136    Specimen: Blood Updated: 04/22/25 0200     Extra Tube hold for add-on     Comment: Auto resulted       Gold Top - SST [[id number]] Collected: 04/22/25 0136    Specimen: Blood Updated: 04/22/25 0200     Extra Tube Hold for add-ons.     Comment: Auto resulted.       Wright Top [[id number]] Collected: 04/22/25 0136    Specimen: Blood Updated: 04/22/25 0200     Extra Tube Hold for add-ons.     Comment: Auto resulted.       CBC & Differential [[id number]]  (Abnormal) Collected: 04/22/25 0136    Specimen: Blood Updated: 04/22/25 0153    Narrative:      The following orders were created for panel order CBC & Differential.  Procedure                               Abnormality         Status                     ---------                               -----------         ------                     CBC Auto Differential[[id number]]        Abnormal            Final result                 Please view results for these tests on the individual orders.    CBC Auto Differential [[id number]]  (Abnormal) Collected: 04/22/25 0136    Specimen: Blood Updated: 04/22/25 0153     WBC 10.86 10*3/mm3      RBC 2.96 10*6/mm3      Hemoglobin 6.9 g/dL      Hematocrit 23.5 %      MCV 79.4 fL      MCH 23.3 pg      MCHC 29.4 g/dL      RDW 17.3 %      RDW-SD 49.2 fl      MPV 9.3 fL      Platelets 311 10*3/mm3      Neutrophil % 72.5 %      Lymphocyte % 22.1 %      Monocyte % 4.2 %      Eosinophil % 0.3 %      Basophil % 0.3 %      Immature Grans % 0.6 %      Neutrophils, Absolute 7.87 10*3/mm3      Lymphocytes,  Absolute 2.40 10*3/mm3      Monocytes, Absolute 0.46 10*3/mm3      Eosinophils, Absolute 0.03 10*3/mm3      Basophils, Absolute 0.03 10*3/mm3      Immature Grans, Absolute 0.07 10*3/mm3      nRBC 0.3 /100 WBC               Radiology:             Imaging Results (Last 24 Hours)       Procedure Component Value Units Date/Time    US Pelvis Complete [[id number]] Collected: 04/22/25 0459     Updated: 04/22/25 0512    Narrative:      US PELVIS COMPLETE    Date of Exam: 4/22/2025 3:47 AM EDT    Indication: heavy irregular vaginal bleeding, pt not sexually active.    Comparison: No comparisons available.    Technique: Transabdominal and transvaginal ultrasound evaluation of the pelvis was performed utilizing grayscale and color Doppler technique.  Doppler spectral analysis was performed.      Findings:  The uterus is 10.8 x 4.5 x 5.5 cm. The endometrial stripe thickness is 8 mm. The left ovary is surgically absent. The right ovary is 3.8 x 2.2 x 2.9 cm. There is a normal appearance of the right ovary with normal color blood flow and morphology. There is   no pelvic free fluid.      Impression:      Impression:  Normal pelvic ultrasound.            Electronically Signed: Jonathan Sheikh MD    4/22/2025 5:08 AM EDT    Workstation ID: [id number]            Results Review:   I reviewed the patient's new clinical results.  I reviewed the patient's new imaging results and agree with the interpretation.      Assessment & Plan     25yo G0 with PCOS, DUB, admitted with symptomatic anemia for blood transfusion.      Will start back on Sprintec.  Discussed she may take two/day x 3 days to try and d/c bleeding but it can take a couple months to get bleeding well-controlled.  Reviewed the importance of being on something and getting seen/following PCOS as it gives her risk for metabolic syndrome later but she can't keep getting blood transfusions as it gives her risk of developing antibodies etc.    I discussed the patients findings and my  recommendations with patient and nursing staff    Naima Conteh MD  04/22/25  08:58 EDT

## 2025-04-22 NOTE — OUTREACH NOTE
Prep Survey      Flowsheet Row Responses   [facility] facility patient discharged from? [place]   Is LACE score < 7 ? Yes   Eligibility [facility]   Date of Admission 04/22/25   Date of Discharge 04/22/25   Discharge Disposition Home or Self Care   Discharge diagnosis Anemia   Does the patient have one of the following disease processes/diagnoses(primary or secondary)? Other   Does the patient have Home health ordered? No   Is there a DME ordered? No   Prep survey completed? Yes            TIMOTHY SHARMA - Registered Nurse

## 2025-04-22 NOTE — ED NOTES
Nallely Jean    Nursing Report ED to Floor:  Mental status: A&OX4  Ambulatory status: ADLIB  Oxygen Therapy:  RA  Cardiac Rhythm: STACH  Admitted from: HOME  Safety Concerns:  BLEEDING  Precautions: NA  Social Issues: NA  ED Room #:  22    ED Nurse Phone Extension - [phone] or may call [phone].      HPI:   Chief Complaint   Patient presents with    Dizziness       Past Medical History:  Past Medical History:   Diagnosis Date    Asthma     as a child    History of blood transfusion     after heavy menses    Menorrhagia     in high school    Migraines         Past Surgical History:  Past Surgical History:   Procedure Laterality Date    ADENOIDECTOMY      DENTAL PROCEDURE      Hurt teeth    EAR TUBES      TONSILLECTOMY          Admitting Doctor:   Naima Conteh MD    Consulting Provider(s):  Consults       No orders found from 3/24/2025 to 4/23/2025.             Admitting Diagnosis:   The primary encounter diagnosis was Symptomatic anemia. Diagnoses of Menorrhagia with irregular cycle, Exertional dyspnea, Postural dizziness with near syncope, Iron deficiency anemia, unspecified iron deficiency anemia type, Malaise and fatigue, History of PCOS, History of obesity, and History of asthma were also pertinent to this visit.    Most Recent Vitals:   Vitals:    04/22/25 0500 04/22/25 0506 04/22/25 0526 04/22/25 0530   BP: 161/87 173/81 173/86    BP Location:  Left arm     Patient Position:  Sitting     Pulse: 94 92 98 98   Resp:  16 18    Temp:  98.1 °F (36.7 °C) 98.3 °F (36.8 °C)    TempSrc:       SpO2: 99% 99% 97% 100%   Weight:       Height:           Active LDAs/IV Access:   Lines, Drains & Airways       Active LDAs       Name Placement date Placement time Site Days    Peripheral IV 04/22/25 0128 18 G Right Antecubital 04/22/25  0128  Antecubital  less than 1                    Labs (abnormal labs have a star):   Labs Reviewed   COMPREHENSIVE METABOLIC PANEL - Abnormal; Notable for the following components:        Result Value    Glucose 128 (*)     Creatinine 1.16 (*)     BUN/Creatinine Ratio 6.0 (*)     All other components within normal limits    Narrative:     GFR Categories in Chronic Kidney Disease (CKD)      GFR Category          GFR (mL/min/1.73)    Interpretation  G1                     90 or greater         Normal or high (1)  G2                      60-89                Mild decrease (1)  G3a                   45-59                Mild to moderate decrease  G3b                   30-44                Moderate to severe decrease  G4                    15-29                Severe decrease  G5                    14 or less           Kidney failure          (1)In the absence of evidence of kidney disease, neither GFR category G1 or G2 fulfill the criteria for CKD.    eGFR calculation 2021 CKD-EPI creatinine equation, which does not include race as a factor   URINALYSIS W/ MICROSCOPIC IF INDICATED (NO CULTURE) - Abnormal; Notable for the following components:    Appearance, UA Cloudy (*)     Ketones, UA Trace (*)     Blood, UA Large (3+) (*)     Protein, UA 30 mg/dL (1+) (*)     Leuk Esterase, UA Small (1+) (*)     All other components within normal limits   CBC WITH AUTO DIFFERENTIAL - Abnormal; Notable for the following components:    WBC 10.86 (*)     RBC 2.96 (*)     Hemoglobin 6.9 (*)     Hematocrit 23.5 (*)     MCH 23.3 (*)     MCHC 29.4 (*)     RDW 17.3 (*)     Monocyte % 4.2 (*)     Immature Grans % 0.6 (*)     Neutrophils, Absolute 7.87 (*)     Immature Grans, Absolute 0.07 (*)     nRBC 0.3 (*)     All other components within normal limits   RETICULOCYTES - Abnormal; Notable for the following components:    Reticulocyte % 2.57 (*)     All other components within normal limits   IRON PROFILE - Abnormal; Notable for the following components:    Iron 15 (*)     Iron Saturation (TSAT) 3 (*)     All other components within normal limits   URINALYSIS, MICROSCOPIC ONLY - Abnormal; Notable for the following  components:    RBC, UA Too Numerous to Count (*)     WBC, UA 11-20 (*)     Squamous Epithelial Cells, UA 7-12 (*)     All other components within normal limits   FERRITIN - Normal    Narrative:     Results may be falsely decreased if patient taking Biotin.     RAINBOW DRAW    Narrative:     The following orders were created for panel order [place] Draw.  Procedure                               Abnormality         Status                     ---------                               -----------         ------                     Green Top (Gel)[[id number]]                                  Final result               Lavender Top[[id number]]                                     Final result               Gold Top - SST[[id number]]                                   Final result               Gray Top[[id number]]                                         Final result               Light Blue Top[[id number]]                                   Final result                 Please view results for these tests on the individual orders.   HCG, QUANTITATIVE, PREGNANCY    Narrative:     HCG Ranges by Gestational Age    Females - non-pregnant premenopausal   </= 1mIU/mL HCG  Females - postmenopausal               </= 7mIU/mL HCG    3 Weeks         5.8 -    71.2 mIU/mL  4 Weeks         9.5 -     750 mIU/mL  5 Weeks         217 -   7,138 mIU/mL  6 Weeks         158 -  31,795 mIU/mL  7 Weeks       3,697 - 163,563 mIU/mL  8 Weeks      32,065 - 149,571 mIU/mL  9 Weeks      63,803 - 151,410 mIU/mL  10 Weeks     46,509 - 186,977 mIU/mL  12 Weeks     27,832 - 210,612 mIU/mL  14 Weeks     13,950 -  62,530 mIU/mL  15 Weeks     12,039 -  70,971 mIU/mL  16 Weeks      9,040 -  56,451 mIU/mL  17 Weeks      8,175 -  55,868 mIU/mL  18 Weeks      8,099 -  58,176 mIU/mL   FOLATE   VITAMIN B12   POCT GLUCOSE FINGERSTICK   TYPE AND SCREEN   PREPARE RBC   ABORH 2ND SPECIMEN VERIFICATION   CBC AND DIFFERENTIAL    Narrative:     The following orders were created  for panel order CBC & Differential.  Procedure                               Abnormality         Status                     ---------                               -----------         ------                     CBC Auto Differential[[id number]]        Abnormal            Final result                 Please view results for these tests on the individual orders.   GREEN TOP   LAVENDER TOP   GOLD TOP - SST   GRAY TOP   LIGHT BLUE TOP       Meds Given in ED:   Medications   sodium chloride 0.9 % flush 10 mL (has no administration in time range)   sodium chloride 0.9 % flush 10 mL (has no administration in time range)   sodium chloride 0.9 % bolus 1,000 mL (0 mL Intravenous Stopped 4/22/25 0324)           Last NIH score:                                                          Dysphagia screening results:  Patient Factors Component (Dysphagia:Stroke or Rule-out)  Best Eye Response: 4-->(E4) spontaneous (04/22/25 0237)  Best Motor Response: 6-->(M6) obeys commands (04/22/25 0237)  Best Verbal Response: 5-->(V5) oriented (04/22/25 0237)  [place] Coma Scale Score: 15 (04/22/25 0237)     [place] Coma Scale:  No data recorded     CIWA:        Restraint Type:            Isolation Status:  No active isolations

## 2025-04-22 NOTE — TELEPHONE ENCOUNTER
Caller: Jeanne Jean    Relationship: Mother    Best call back number: [phone]     What was the call regarding: PATIENT IS CURRENTLY IN HOSPITAL, NEEDED BLOOD. WILL BE RELEASED WHEN BLOOD LEVELS ARE NORMAL. BLOOD WORK AND ULTRASOUND DONE. PLEASE CHECK ORDERS.     Is it okay if the provider responds through MyChart: NO

## 2025-04-22 NOTE — NURSING NOTE
Patient approved for discharge per MD order. IV removed intact without issue. Patient remains on RA with VSS, voiding appropriately, bowel sounds active x4, tolerating diet and ambulating appropriately. RN completed discharge instructions including medications prescribed, follow up appointments, activity restrictions, and infection prevention. Discharge medications sent to home pharmacy. Patient educated on signs of excessive vaginal bleeding and management. Patient verbalized understanding and denies any further questions at this time.

## 2025-04-22 NOTE — CASE MANAGEMENT/SOCIAL WORK
Case Management Discharge Note      Final Note: [facility]         Selected Continued Care - Admitted Since 4/22/2025       Destination    No services have been selected for the patient.                Durable Medical Equipment    No services have been selected for the patient.                Dialysis/Infusion    No services have been selected for the patient.                Home Medical Care    No services have been selected for the patient.                Therapy    No services have been selected for the patient.                Community Resources    No services have been selected for the patient.                Community & DME    No services have been selected for the patient.                         Final Discharge Disposition Code: 01 - home or self-care

## 2025-04-23 ENCOUNTER — TRANSITIONAL CARE MANAGEMENT TELEPHONE ENCOUNTER (OUTPATIENT)
Dept: CALL CENTER | Facility: HOSPITAL | Age: 25
End: 2025-04-23
Payer: MEDICAID

## 2025-04-23 LAB
BH BB BLOOD EXPIRATION DATE: NORMAL
BH BB BLOOD EXPIRATION DATE: NORMAL
BH BB BLOOD TYPE BARCODE: 6200
BH BB BLOOD TYPE BARCODE: 6200
BH BB DISPENSE STATUS: NORMAL
BH BB DISPENSE STATUS: NORMAL
BH BB PRODUCT CODE: NORMAL
BH BB PRODUCT CODE: NORMAL
BH BB UNIT NUMBER: NORMAL
BH BB UNIT NUMBER: NORMAL
CROSSMATCH INTERPRETATION: NORMAL
CROSSMATCH INTERPRETATION: NORMAL
UNIT  ABO: NORMAL
UNIT  ABO: NORMAL
UNIT  RH: NORMAL
UNIT  RH: NORMAL

## 2025-04-23 NOTE — OUTREACH NOTE
Call Center TCM Note      Flowsheet Row Responses   [facility] [place] by [facility] patient discharged from? [place]   Does the patient have one of the following disease processes/diagnoses(primary or secondary)? Other   TCM attempt successful? Yes   Call start time 0926   Call end time 0931   Discharge diagnosis Symptomatic anemia, PCOS, Obesity, DUB   Person spoke with today (if not patient) and relationship Patient   Meds reviewed with patient/caregiver? Yes   Does the patient have all medications ordered at discharge? Yes   Is the patient taking all medications as directed (includes completed medication regime)? Yes   Comments PCP Cirstal SCHOFIELD. Hospital follow up appt scheduled for4/30  11am with PCP.   Does the patient have an appointment with their PCP within 7-14 days of discharge? No   Nursing Interventions Assisted patient with making appointment per protocol, Routed TCM call to PCP office   Has home health visited the patient within 72 hours of discharge? N/A   Psychosocial issues? No   Did the patient receive a copy of their discharge instructions? Yes   Nursing interventions Reviewed instructions with patient   What is the patient's perception of their health status since discharge? Improving  [Patient reports minimal spotting today.]   Is the patient/caregiver able to teach back signs and symptoms related to disease process for when to call PCP? Yes   Is the patient/caregiver able to teach back signs and symptoms related to disease process for when to call 911? Yes   Is the patient/caregiver able to teach back the hierarchy of who to call/visit for symptoms/problems? PCP, Specialist, Home health nurse, Urgent Care, ED, 911 Yes   If the patient is a current smoker, are they able to teach back resources for cessation? Not a smoker   TCM call completed? Yes   Wrap up additional comments patient will also be following up with her GYN    Call end time 0931   Would this patient benefit from a Referral to Amb  Social Work? No   Is the patient interested in additional calls from an ambulatory ? No            CARINE ORDOÑEZ - Registered Nurse    4/23/2025, 09:33 EDT

## 2025-04-24 LAB
EST. AVERAGE GLUCOSE BLD GHB EST-MCNC: 120 MG/DL
HBA1C MFR BLD: 5.8 % (ref 4.8–5.6)

## 2025-04-25 LAB
QT INTERVAL: 346 MS
QTC INTERVAL: 444 MS

## 2025-04-30 ENCOUNTER — OFFICE VISIT (OUTPATIENT)
Dept: INTERNAL MEDICINE | Facility: CLINIC | Age: 25
End: 2025-04-30
Payer: MEDICAID

## 2025-04-30 VITALS
DIASTOLIC BLOOD PRESSURE: 70 MMHG | HEIGHT: 69 IN | SYSTOLIC BLOOD PRESSURE: 116 MMHG | WEIGHT: 293 LBS | HEART RATE: 100 BPM | BODY MASS INDEX: 43.4 KG/M2 | RESPIRATION RATE: 18 BRPM | OXYGEN SATURATION: 99 % | TEMPERATURE: 96.2 F

## 2025-04-30 DIAGNOSIS — N92.1 MENORRHAGIA WITH IRREGULAR CYCLE: ICD-10-CM

## 2025-04-30 DIAGNOSIS — D64.9 SYMPTOMATIC ANEMIA: Primary | ICD-10-CM

## 2025-04-30 NOTE — PROGRESS NOTES
Transitional Care Follow Up Visit  Subjective     Nalelly Jean is a 24 y.o. female who presents for a transitional care management visit.    Within 48 business hours after discharge our office contacted her via telephone to coordinate her care and needs.      I reviewed and discussed the details of that call along with the discharge summary, hospital problems, inpatient lab results, inpatient diagnostic studies, and consultation reports with Nallely.     Current outpatient and discharge medications have been reconciled for the patient.  Reviewed by: KANWAL Arevalo          4/22/2025     7:09 PM   Date of TCM Phone Call   [facility]   Date of Admission 4/22/2025   Date of Discharge 4/22/2025   Discharge Disposition Home or Self Care     Risk for Readmission (LACE) No data recorded    History of Present Illness   Course During Hospital Stay:        Nallely is a 24-year-old female who is being seen for hospital follow-up visit.  She has a history of menorrhagia.  She had not been seen in quite some time and had evidently been off her birth control as she has not seen her gynecologist either.  She went to the emergency department with heavy vaginal bleeding that have been ongoing for 8 days her labs are consistent with iron deficiency anemia and she was transfused 2 units packed red blood cells.  She was discharged home on Sprintec contraception.  She was advised to take 2 pills a day for 3 days and then decrease to 1 pill a day.  She is concerned if this would cause any problems.  She has been having no side effects.  In the ER she had a normal pelvic ultrasound      This is the 3rd episode of heavy vaginal bleeding requiring transfusion since menarche at age 10. Menses have always been irregular unless taking OCPs. She had been on OCPs for a few years but ran out in fall 2024. Reports a spontaneous period in January then nothing till April.     Repeat labs 4/24 at GYN were much better after  "discharge.   Denies any current bleeding   Will seen GYN again in 3 months        The following portions of the patient's history were reviewed and updated as appropriate: allergies, current medications, past family history, past medical history, past social history, past surgical history, and problem list.    Review of Systems   Constitutional:  Negative for chills, fatigue, fever and unexpected weight change.   Respiratory:  Negative for cough, chest tightness, shortness of breath and wheezing.    Cardiovascular:  Negative for chest pain, palpitations and leg swelling.   Gastrointestinal:  Negative for constipation, diarrhea, nausea and vomiting.   Genitourinary:  Positive for menstrual problem. Negative for difficulty urinating and dysuria.   Skin:  Negative for color change and rash.   Neurological:  Negative for dizziness, syncope, weakness and headaches.   Psychiatric/Behavioral:  Negative for sleep disturbance.        Objective   /70 (BP Location: Right arm, Patient Position: Sitting, Cuff Size: Adult)   Pulse 100   Temp 96.2 °F (35.7 °C) (Infrared)   Resp 18   Ht 175.3 cm (69.02\")   Wt (!) 148 kg (325 lb 9.6 oz)   SpO2 99%   BMI 48.06 kg/m²   Physical Exam  Constitutional:       Appearance: Normal appearance. She is well-developed. She is morbidly obese.   HENT:      Head: Normocephalic and atraumatic.   Eyes:      General: No scleral icterus.     Conjunctiva/sclera: Conjunctivae normal.   Cardiovascular:      Rate and Rhythm: Normal rate and regular rhythm.      Heart sounds: Normal heart sounds.   Pulmonary:      Effort: Pulmonary effort is normal. No respiratory distress.      Breath sounds: Normal breath sounds.   Abdominal:      General: Bowel sounds are normal.      Palpations: Abdomen is soft.      Tenderness: There is no abdominal tenderness.   Musculoskeletal:         General: Normal range of motion.      Cervical back: Normal range of motion.      Right lower leg: No edema.      Left " lower leg: No edema.   Skin:     General: Skin is warm and dry.   Neurological:      General: No focal deficit present.      Mental Status: She is alert and oriented to person, place, and time.   Psychiatric:         Attention and Perception: Attention normal.         Mood and Affect: Mood and affect normal.         Behavior: Behavior normal. Behavior is cooperative.         Thought Content: Thought content normal.         Cognition and Memory: Cognition normal.         Judgment: Judgment normal.         Assessment & Plan   Diagnoses and all orders for this visit:    1. Symptomatic anemia (Primary)  -     CBC (No Diff); Future  -     Iron Profile; Future  -     Ferritin; Future    2. Menorrhagia with irregular cycle  -     CBC (No Diff); Future  -     Iron Profile; Future  -     Ferritin; Future    Recheck labs in 1 month.  Orders entered.  Patient informed that she will not need appointment to get these collected.  She should just stop down at the lab at that time.  She will follow-up with me in about 3 months for chronic condition follow-up as well.  If has any recurrence in vaginal bleeding that is heavy, she should immediately reach out to her GYN    Return in about 3 months (around 7/30/2025) for chronic condition follow up, and needs to return for labs in 1month(ordered).  I discussed the findings and my recommendations with patient.  Patient was encouraged to keep me informed of any acute changes, lack of improvement, or any new concerning symptoms.